# Patient Record
Sex: MALE | Race: ASIAN | Employment: FULL TIME | URBAN - METROPOLITAN AREA
[De-identification: names, ages, dates, MRNs, and addresses within clinical notes are randomized per-mention and may not be internally consistent; named-entity substitution may affect disease eponyms.]

---

## 2018-12-14 PROBLEM — M25.561 RIGHT KNEE PAIN, UNSPECIFIED CHRONICITY: Status: ACTIVE | Noted: 2018-12-14

## 2021-04-27 PROBLEM — S46.102A INJURY OF TENDON OF LONG HEAD OF LEFT BICEPS: Status: ACTIVE | Noted: 2021-04-27

## 2021-04-27 PROBLEM — M70.42 PREPATELLAR BURSITIS, LEFT KNEE: Status: ACTIVE | Noted: 2021-04-27

## 2021-04-27 PROBLEM — S43.52XA SPRAIN OF LEFT ACROMIOCLAVICULAR JOINT: Status: ACTIVE | Noted: 2021-04-27

## 2021-05-11 PROBLEM — S46.112A LABRAL TEAR OF LONG HEAD OF LEFT BICEPS TENDON: Status: ACTIVE | Noted: 2021-05-11

## 2021-07-19 PROBLEM — G89.29 CHRONIC MIDLINE LOW BACK PAIN WITHOUT SCIATICA: Status: ACTIVE | Noted: 2021-07-19

## 2021-07-19 PROBLEM — M54.50 CHRONIC MIDLINE LOW BACK PAIN WITHOUT SCIATICA: Status: ACTIVE | Noted: 2021-07-19

## 2021-09-08 PROBLEM — Z47.89 AFTERCARE FOLLOWING SURGERY OF THE MUSCULOSKELETAL SYSTEM: Status: ACTIVE | Noted: 2021-09-08

## 2023-12-04 ENCOUNTER — OFFICE VISIT (OUTPATIENT)
Dept: WOUND CARE | Facility: HOSPITAL | Age: 53
End: 2023-12-04
Attending: INTERNAL MEDICINE
Payer: COMMERCIAL

## 2023-12-04 VITALS
DIASTOLIC BLOOD PRESSURE: 91 MMHG | SYSTOLIC BLOOD PRESSURE: 157 MMHG | RESPIRATION RATE: 18 BRPM | TEMPERATURE: 98 F | HEART RATE: 121 BPM

## 2023-12-04 DIAGNOSIS — I87.331 IDIOPATHIC CHRONIC VENOUS HYPERTENSION OF RIGHT LOWER EXTREMITY WITH ULCER AND INFLAMMATION (HCC): ICD-10-CM

## 2023-12-04 DIAGNOSIS — I89.0 LYMPHEDEMA: ICD-10-CM

## 2023-12-04 DIAGNOSIS — L97.212 NON-PRESSURE CHRONIC ULCER OF RIGHT CALF WITH FAT LAYER EXPOSED (HCC): Primary | ICD-10-CM

## 2023-12-04 DIAGNOSIS — L97.312 NON-PRESSURE CHRONIC ULCER OF RIGHT ANKLE WITH FAT LAYER EXPOSED (HCC): ICD-10-CM

## 2023-12-04 DIAGNOSIS — R60.0 PEDAL EDEMA: ICD-10-CM

## 2023-12-04 PROCEDURE — 29581 APPL MULTLAYER CMPRN SYS LEG: CPT

## 2023-12-04 PROCEDURE — 99214 OFFICE O/P EST MOD 30 MIN: CPT

## 2023-12-04 PROCEDURE — 97597 DBRDMT OPN WND 1ST 20 CM/<: CPT | Performed by: INTERNAL MEDICINE

## 2023-12-04 NOTE — PROGRESS NOTES
Patient ID: Neyda Simmons is a 48year old male. Debridement Venous Ulcer Right; Anterior   Wound 12/04/23 #2- right anterior leg Right; Anterior    Performed by: Harshad Arellano MD  Authorized by: Harshad Arellano MD      Consent   Consent obtained? verbal  Consent given by: patient    Debridement Details  Performed by: physician  Debridement type: conservative sharp  Pain control: lidocaine 4%  Pain control administration type: topical    Pre-debridement measurements  Length (cm): 3.8  Width (cm): 1.6  Depth (cm): 0.1  Surface Area (cm^2): 6.08    Post-debridement measurements  Length (cm): 3.8  Width (cm): 1.6  Depth (cm): 0.2  Percent debrided: 100%  Surface Area (cm^2): 6.08  Area Debrided (cm^2): 6.08  Volume (cm^3): 1.22    Devitalized tissue debrided: biofilm and slough  Instrument(s) utilized: curette  Bleeding: small  Hemostasis obtained with: pressure  Procedural pain (0-10): 0  Post-procedural pain: 0   Response to treatment: procedure was tolerated well

## 2023-12-04 NOTE — PROGRESS NOTES
.Weekly Wound Education Note    Teaching Provided To: Patient  Training Topics: Discharge instructions;Dressing;Edema control; Compression  Training Method: Explain/Verbal;Written  Training Response: Patient responds and understands; Reinforcement needed

## 2023-12-04 NOTE — PATIENT INSTRUCTIONS
Low salt diet  Leg elevation  Modification at work for a sitting job for 4 weeks. Vascular MD consult. Return later this week for dressing change. See me in one week. Wound Cleaning and Dressings:    Dressing changes only in the clinic. Shower with protection - get SHOWER BOOT or CAST COVER from any pharmacy ( [address] or [facility] are options). Apply steroid cream on both legs ( CORTISONE CREAM  over the counter on left leg )   DRESSINGS: rocío / HF transfer / kerramax /   Change dressing only in the clinic. Compression Therapy : coflex-2  Compression Therapy Instructions:  1. Okay to wear overnight       2. Avoid prolonged standing in one place. It is better to have your calf muscles moving       to pump fluid out of the legs. 3.  Elevate leg(s) above the level of the heart when sitting or as much as possible. 4.  Take your diuretics as directed by your provider. Do not skip doses or change doses      unless instructed to do so by your provider. 5. Do not get leg(s) with compression wrap wet. If wraps are too tight as indicated        By pain, numbness/tingling or discoloration of toes remove wrap completely       and call the   wound center. Miscellaneous Instructions:  Supplement with a daily multivitamin   Low salt diet  Increase protein intake / consider protein supplements - see below  Elevate extremities at all times when sitting / laying down.     DIETARY MODIFICATIONS TO HELP WITH WOUND HEALING:    Protein: Meats, beans, eggs, milk and yogurt particularly [place] yogurt), tofu, soy nuts, soy protein products    Vitamin C: Citrus fruits and juices, strawberries, tomatoes, tomato juice, peppers, baked potatoes, spinach, broccoli, cauliflower, [place] sprouts, cabbage    Vitamin A: Dark green, leafy vegetables, orange or yellow vegetables, cantaloupe, fortified dairy products, liver, fortified cereals    Zinc: Fortified cereals, red meats, seafood    Consider Iván by [employer] (These are essential branch chain amino acids that help with tissue building and wound healing) and take 2 packets/day. you can order online at [employer] or [facility] [employer] REMINDERS:    The treatment plan has been discussed at length with you and your provider. Follow all instructions carefully, it is very important. If you do not follow all instructions, you are at  risk of your wound not healing, infection, possible loss of limb and even end of life. Please call the clinic during regular business hours ( 7:30 AM - 5:30 PM) if you notice increased bleeding, redness, warmth, pain or pus like drainage or start running a fever greater than 100.3. For after hour emergencies, please call your primary physician or go to the nearest emergency room.

## 2023-12-08 ENCOUNTER — OFFICE VISIT (OUTPATIENT)
Dept: WOUND CARE | Facility: HOSPITAL | Age: 53
End: 2023-12-08
Attending: INTERNAL MEDICINE
Payer: COMMERCIAL

## 2023-12-08 VITALS
RESPIRATION RATE: 16 BRPM | HEART RATE: 93 BPM | TEMPERATURE: 98 F | SYSTOLIC BLOOD PRESSURE: 154 MMHG | DIASTOLIC BLOOD PRESSURE: 87 MMHG

## 2023-12-08 DIAGNOSIS — L97.212 NON-PRESSURE CHRONIC ULCER OF RIGHT CALF WITH FAT LAYER EXPOSED (HCC): Primary | ICD-10-CM

## 2023-12-08 DIAGNOSIS — L97.312 NON-PRESSURE CHRONIC ULCER OF RIGHT ANKLE WITH FAT LAYER EXPOSED (HCC): ICD-10-CM

## 2023-12-08 PROCEDURE — 29581 APPL MULTLAYER CMPRN SYS LEG: CPT

## 2023-12-11 ENCOUNTER — OFFICE VISIT (OUTPATIENT)
Dept: WOUND CARE | Facility: HOSPITAL | Age: 53
End: 2023-12-11
Attending: INTERNAL MEDICINE
Payer: COMMERCIAL

## 2023-12-11 VITALS
TEMPERATURE: 98 F | DIASTOLIC BLOOD PRESSURE: 99 MMHG | SYSTOLIC BLOOD PRESSURE: 158 MMHG | RESPIRATION RATE: 16 BRPM | HEART RATE: 75 BPM

## 2023-12-11 DIAGNOSIS — L97.312 NON-PRESSURE CHRONIC ULCER OF RIGHT ANKLE WITH FAT LAYER EXPOSED (HCC): ICD-10-CM

## 2023-12-11 DIAGNOSIS — I87.331 IDIOPATHIC CHRONIC VENOUS HYPERTENSION OF RIGHT LOWER EXTREMITY WITH ULCER AND INFLAMMATION (HCC): ICD-10-CM

## 2023-12-11 DIAGNOSIS — I89.0 LYMPHEDEMA: ICD-10-CM

## 2023-12-11 DIAGNOSIS — R60.0 PEDAL EDEMA: ICD-10-CM

## 2023-12-11 DIAGNOSIS — L97.212 NON-PRESSURE CHRONIC ULCER OF RIGHT CALF WITH FAT LAYER EXPOSED (HCC): Primary | ICD-10-CM

## 2023-12-11 PROCEDURE — 29581 APPL MULTLAYER CMPRN SYS LEG: CPT

## 2023-12-11 NOTE — PATIENT INSTRUCTIONS
Return  for nurse visit on 12/18 and 12/22   See me on 12/29/23   Low salt diet  Leg elevation  Modification at work for a sitting job for 4 weeks. Vascular MD consult- 12/27/23  See me in one week. Wound Cleaning and Dressings:    Dressing changes only in the clinic. Shower with protection - get SHOWER BOOT or CAST COVER from any pharmacy ( [address] or [facility] are options). Apply steroid cream on both legs ( CORTISONE CREAM  over the counter on left leg )   DRESSINGS: rocío / HF transfer / kerramax /   Change dressing only in the clinic. Compression Therapy : coflex-2  Compression Therapy Instructions:  1. Okay to wear overnight       2. Avoid prolonged standing in one place. It is better to have your calf muscles moving       to pump fluid out of the legs. 3.  Elevate leg(s) above the level of the heart when sitting or as much as possible. 4.  Take your diuretics as directed by your provider. Do not skip doses or change doses      unless instructed to do so by your provider. 5. Do not get leg(s) with compression wrap wet. If wraps are too tight as indicated        By pain, numbness/tingling or discoloration of toes remove wrap completely       and call the   wound center. Miscellaneous Instructions:  Supplement with a daily multivitamin   Low salt diet  Increase protein intake / consider protein supplements - see below  Elevate extremities at all times when sitting / laying down.     DIETARY MODIFICATIONS TO HELP WITH WOUND HEALING:    Protein: Meats, beans, eggs, milk and yogurt particularly [place] yogurt), tofu, soy nuts, soy protein products    Vitamin C: Citrus fruits and juices, strawberries, tomatoes, tomato juice, peppers, baked potatoes, spinach, broccoli, cauliflower, [place] sprouts, cabbage    Vitamin A: Dark green, leafy vegetables, orange or yellow vegetables, cantaloupe, fortified dairy products, liver, fortified cereals    Zinc: Fortified cereals, red meats, seafood    Consider Iván by [employer] (These are essential branch chain amino acids that help with tissue building and wound healing) and take 2 packets/day. you can order online at [employer] or [id number] [facility] [employer] REMINDERS:    The treatment plan has been discussed at length with you and your provider. Follow all instructions carefully, it is very important. If you do not follow all instructions, you are at  risk of your wound not healing, infection, possible loss of limb and even end of life. Please call the clinic during regular business hours ( 7:30 AM - 5:30 PM) if you notice increased bleeding, redness, warmth, pain or pus like drainage or start running a fever greater than 100.3. For after hour emergencies, please call your primary physician or go to the nearest emergency room.

## 2023-12-11 NOTE — PROGRESS NOTES
.Weekly Wound Education Note    Teaching Provided To: Patient  Training Topics: Dressing; Discharge instructions;Edema control; Compression  Training Method: Explain/Verbal;Written  Training Response: Patient responds and understands            Wound to ankle has healed. Leg wound continues to improve. Continue hydrofera transfer, kerramax, calamine unna boot 30-40mmHg.   New compression garments ordered from [place].

## 2023-12-18 ENCOUNTER — OFFICE VISIT (OUTPATIENT)
Dept: WOUND CARE | Facility: HOSPITAL | Age: 53
End: 2023-12-18
Attending: INTERNAL MEDICINE
Payer: COMMERCIAL

## 2023-12-18 VITALS
TEMPERATURE: 99 F | SYSTOLIC BLOOD PRESSURE: 142 MMHG | DIASTOLIC BLOOD PRESSURE: 98 MMHG | HEART RATE: 95 BPM | RESPIRATION RATE: 14 BRPM

## 2023-12-18 DIAGNOSIS — L97.212 NON-PRESSURE CHRONIC ULCER OF RIGHT CALF WITH FAT LAYER EXPOSED (HCC): Primary | ICD-10-CM

## 2023-12-18 PROCEDURE — 29581 APPL MULTLAYER CMPRN SYS LEG: CPT

## 2023-12-22 ENCOUNTER — OFFICE VISIT (OUTPATIENT)
Dept: WOUND CARE | Facility: HOSPITAL | Age: 53
End: 2023-12-22
Attending: INTERNAL MEDICINE
Payer: COMMERCIAL

## 2023-12-22 VITALS
DIASTOLIC BLOOD PRESSURE: 106 MMHG | TEMPERATURE: 98 F | HEART RATE: 108 BPM | SYSTOLIC BLOOD PRESSURE: 147 MMHG | RESPIRATION RATE: 16 BRPM

## 2023-12-22 DIAGNOSIS — L97.212 NON-PRESSURE CHRONIC ULCER OF RIGHT CALF WITH FAT LAYER EXPOSED (HCC): Primary | ICD-10-CM

## 2023-12-22 PROCEDURE — 29581 APPL MULTLAYER CMPRN SYS LEG: CPT

## 2023-12-27 ENCOUNTER — APPOINTMENT (OUTPATIENT)
Dept: WOUND CARE | Facility: HOSPITAL | Age: 53
End: 2023-12-27
Attending: INTERNAL MEDICINE
Payer: COMMERCIAL

## 2023-12-27 VITALS
DIASTOLIC BLOOD PRESSURE: 100 MMHG | HEART RATE: 105 BPM | SYSTOLIC BLOOD PRESSURE: 138 MMHG | TEMPERATURE: 98 F | RESPIRATION RATE: 18 BRPM

## 2023-12-27 DIAGNOSIS — L97.212 NON-PRESSURE CHRONIC ULCER OF RIGHT CALF WITH FAT LAYER EXPOSED (HCC): Primary | ICD-10-CM

## 2023-12-27 DIAGNOSIS — L97.312 NON-PRESSURE CHRONIC ULCER OF RIGHT ANKLE WITH FAT LAYER EXPOSED (HCC): ICD-10-CM

## 2023-12-27 DIAGNOSIS — I87.331 IDIOPATHIC CHRONIC VENOUS HYPERTENSION OF RIGHT LOWER EXTREMITY WITH ULCER AND INFLAMMATION (HCC): ICD-10-CM

## 2023-12-27 DIAGNOSIS — R60.0 PEDAL EDEMA: ICD-10-CM

## 2023-12-27 DIAGNOSIS — I89.0 LYMPHEDEMA: ICD-10-CM

## 2023-12-27 PROCEDURE — 29581 APPL MULTLAYER CMPRN SYS LEG: CPT

## 2023-12-27 NOTE — PATIENT INSTRUCTIONS
Low salt diet  Leg elevation  Modification at work for a sitting job until wound closes. See me in one week. Wound Cleaning and Dressings:    Dressing changes only in the clinic. Shower with protection - get SHOWER BOOT or CAST COVER from any pharmacy ( [facility] or [facility] are options). DRESSINGS: rocío / HF transfer / kerramax /   Change dressing only in the clinic. Compression Therapy : coflex-2  Compression Therapy Instructions:  1. Okay to wear overnight       2. Avoid prolonged standing in one place. It is better to have your calf muscles moving       to pump fluid out of the legs. 3.  Elevate leg(s) above the level of the heart when sitting or as much as possible. 4.  Take your diuretics as directed by your provider. Do not skip doses or change doses      unless instructed to do so by your provider. 5. Do not get leg(s) with compression wrap wet. If wraps are too tight as indicated        By pain, numbness/tingling or discoloration of toes remove wrap completely       and call the   wound center. Miscellaneous Instructions:  Supplement with a daily multivitamin   Low salt diet  Increase protein intake / consider protein supplements - see below  Elevate extremities at all times when sitting / laying down. DIETARY MODIFICATIONS TO HELP WITH WOUND HEALING:    Protein: Meats, beans, eggs, milk and yogurt particularly [place] yogurt), tofu, soy nuts, soy protein products    Vitamin C: Citrus fruits and juices, strawberries, tomatoes, tomato juice, peppers, baked potatoes, spinach, broccoli, cauliflower, [place] sprouts, cabbage    Vitamin A: Dark green, leafy vegetables, orange or yellow vegetables, cantaloupe, fortified dairy products, liver, fortified cereals    Zinc: Fortified cereals, red meats, seafood    Consider Iván by [employer] (These are essential branch chain amino acids that help with tissue building and wound healing) and take 2 packets/day.  you can order online at [employer] or [id number] REMINDERS:    The treatment plan has been discussed at length with you and your provider. Follow all instructions carefully, it is very important. If you do not follow all instructions, you are at  risk of your wound not healing, infection, possible loss of limb and even end of life. Please call the clinic during regular business hours ( 7:30 AM - 5:30 PM) if you notice increased bleeding, redness, warmth, pain or pus like drainage or start running a fever greater than 100.3. For after hour emergencies, please call your primary physician or go to the nearest emergency room.

## 2023-12-27 NOTE — PROGRESS NOTES
Weekly Wound Education Note    Teaching Provided To: Patient  Training Topics: Cleasing and general instructions; Compression; Discharge instructions;Dressing;Edema control  Training Method: Explain/Verbal  Training Response: Patient responds and understands; Reinforcement needed        Notes: Improving. Pt states ultrasound completed by vascular today. Endoform, hydrofera transfer, ABD pad, unna boot 30-40mmhg.   Ordered compression garment from [facility] [employer].

## 2024-01-03 ENCOUNTER — OFFICE VISIT (OUTPATIENT)
Dept: WOUND CARE | Facility: HOSPITAL | Age: 54
End: 2024-01-03
Attending: INTERNAL MEDICINE
Payer: COMMERCIAL

## 2024-01-03 VITALS
HEART RATE: 116 BPM | DIASTOLIC BLOOD PRESSURE: 95 MMHG | RESPIRATION RATE: 14 BRPM | TEMPERATURE: 99 F | SYSTOLIC BLOOD PRESSURE: 149 MMHG

## 2024-01-03 DIAGNOSIS — R60.0 PEDAL EDEMA: ICD-10-CM

## 2024-01-03 DIAGNOSIS — I89.0 LYMPHEDEMA: ICD-10-CM

## 2024-01-03 DIAGNOSIS — L97.312 NON-PRESSURE CHRONIC ULCER OF RIGHT ANKLE WITH FAT LAYER EXPOSED (HCC): ICD-10-CM

## 2024-01-03 DIAGNOSIS — L97.212 NON-PRESSURE CHRONIC ULCER OF RIGHT CALF WITH FAT LAYER EXPOSED (HCC): Primary | ICD-10-CM

## 2024-01-03 DIAGNOSIS — I87.331 IDIOPATHIC CHRONIC VENOUS HYPERTENSION OF RIGHT LOWER EXTREMITY WITH ULCER AND INFLAMMATION (HCC): ICD-10-CM

## 2024-01-03 PROCEDURE — 29581 APPL MULTLAYER CMPRN SYS LEG: CPT

## 2024-01-03 NOTE — PROGRESS NOTES
Weekly Wound Education Note    Teaching Provided To: Patient  Training Topics: Cleasing and general instructions;Compression;Discharge instructions;Dressing;Edema control  Training Method: Explain/Verbal  Training Response: Patient responds and understands;Reinforcement needed        Notes: Improving, silver foam, unna boot 30-40mmhg. Pt borught in supplies from [employer] but no compression stocking. Message sent to rep for an update on compression.

## 2024-01-03 NOTE — PATIENT INSTRUCTIONS
Low salt diet  Leg elevation  Modification at work for a sitting job until wound closes.   See me in one week.     Wound Cleaning and Dressings:    Dressing changes only in the clinic.   Shower with protection - get SHOWER BOOT or CAST COVER from any pharmacy ( [facility] or [facility] are options).   DRESSINGS: rocío / HF transfer / kerramax /   Change dressing only in the clinic.     Compression Therapy : coflex-2  Compression Therapy Instructions:  1. Okay to wear overnight       2.  Avoid prolonged standing in one place.  It is better to have your calf muscles moving       to pump fluid out of the legs.    3.  Elevate leg(s) above the level of the heart when sitting or as much as possible.    4.  Take your diuretics as directed by your provider.  Do not skip doses or change doses      unless instructed to do so by your provider.    5. Do not get leg(s) with compression wrap wet. If wraps are too tight as indicated        By pain, numbness/tingling or discoloration of toes remove wrap completely       and call the   wound center.     Miscellaneous Instructions:  Supplement with a daily multivitamin   Low salt diet  Increase protein intake / consider protein supplements - see below  Elevate extremities at all times when sitting / laying down.    DIETARY MODIFICATIONS TO HELP WITH WOUND HEALING:    Protein: Meats, beans, eggs, milk and yogurt particularly [place] yogurt), tofu, soy nuts, soy protein products    Vitamin C: Citrus fruits and juices, strawberries, tomatoes, tomato juice, peppers, baked potatoes, spinach, broccoli, cauliflower, [place] sprouts, cabbage    Vitamin A: Dark green, leafy vegetables, orange or yellow vegetables, cantaloupe, fortified dairy products, liver, fortified cereals    Zinc: Fortified cereals, red meats, seafood    Consider Iván by [employer] (These are essential branch chain amino acids that help with tissue building and wound healing) and take 2 packets/day. you can order  online at [employer] or [employer]    ADDITIONAL REMINDERS:    The treatment plan has been discussed at length with you and your provider. Follow all instructions carefully, it is very important. If you do not follow all instructions, you are at  risk of your wound not healing, infection, possible loss of limb and even end of life.  Please call the clinic during regular business hours ( 7:30 AM - 5:30 PM) if you notice increased bleeding, redness, warmth, pain or pus like drainage or start running a fever greater than 100.3.    For after hour emergencies, please call your primary physician or go to the nearest emergency room.

## 2024-01-03 NOTE — PROGRESS NOTES
[facility] PROGRESS NOTE  ETELVINA BLANTON MD  1/3/2024    Chief Complaint:   Chief Complaint   Patient presents with    Wound Recheck     Pt here for follow up for right leg wound. He arrives with compression wrap in place. No new concerns        HPI:   Kareem Camejo is a 53 year old male coming in for a follow-up visit.    HPI    Wound closed with a scab  No s/o infection.  Tolerating wrap ok.     Review of Systems  Negative except HPI   Denies chest pain / SOB / palpitations  Denies fever.     Allergies  No Known Allergies    Current Meds:  Current Outpatient Medications   Medication Sig Dispense Refill    colchicine 0.6 MG Oral Tab Take 1 tablet (0.6 mg total) by mouth in the morning, at noon, and at bedtime. (Patient not taking: Reported on 12/4/2023) 15 tablet 0    PEG 3350-KCl-Na Bicarb-NaCl 420 g Oral Recon Soln Take as directed per MD 4000 mL 0    Meloxicam 15 MG Oral Tab Take 1 tablet (15 mg total) by mouth daily. (Patient not taking: Reported on 12/4/2023) 30 tablet 1    Diclofenac Sodium (VOLTAREN) 1 % Transdermal Gel Apply 2 g topically 4 (four) times daily. (Patient not taking: Reported on 12/4/2023) 1 Tube 3         EXAM:   Objective   Objective    Physical Exam    Vital Signs  Vitals:    01/03/24 1500   BP: (!) 149/95   Pulse: 116   Resp: 14   Temp: 98.6 °F (37 °C)       Wound Assessment  Wound 12/04/23 #2- right anterior leg Right;Anterior (Active)   Wound Image   01/03/24 1517   Drainage Amount Scant 01/03/24 1517   Drainage Description Serosanguineous 01/03/24 1517   Treatments Compression 12/27/23 1459   Dressing Other 12/22/23 1449   Wound Length (cm) 1.1 cm 01/03/24 1517   Wound Width (cm) 0.5 cm 01/03/24 1517   Wound Surface Area (cm^2) 0.55 cm^2 01/03/24 1517   Wound Depth (cm) 0 cm 01/03/24 1517   Wound Volume (cm^3) 0 cm^3 01/03/24 1517   Wound Healing % 100 01/03/24 1517   Margins Well-defined edges 01/03/24 1517   Non-staged Wound Description Full thickness  01/03/24 1517   Xuan-wound Assessment Dry;Hemosiderin staining;Edema 01/03/24 1517   Wound Granulation Tissue Red;Pink;Spongy 12/27/23 1459   Wound Bed Granulation (%) 100 % 12/27/23 1459   Wound Bed Slough (%) 10 % 12/11/23 0813   Wound Odor None 01/03/24 1517   Shape 100% scab 01/03/24 1517   Tunneling? No 01/03/24 1517   Undermining? No 01/03/24 1517   Sinus Tracts? No 01/03/24 1517       Compression Wrap 12/04/23 Ankle Right (Active)   Response to Treatment Well tolerated 12/27/23 1520   Compression Layers Multilayer 12/27/23 1520   Compression Product Type Unna Boot 12/27/23 1520   Dressing Applied Yes 12/27/23 1520   Compression Wrap Location Toes to Knee 12/27/23 1520   Compression Wrap Status Dry;Clean;Intact 12/27/23 1520           ASSESSMENT AND PLAN:     Assessment   Assessment    Encounter Diagnosis  1. Non-pressure chronic ulcer of right calf with fat layer exposed (HCC)    2. 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 Idiopathic chronic venous hypertension of right lower extremity with ulcer and inflammation (HCC)    4. Pedal edema    5. Lymphedema        Problem List  Patient Active Problem List   Diagnosis    Right knee pain, unspecified chronicity    Prepatellar bursitis, left knee    Injury of tendon of long head of left biceps    Sprain of left acromioclavicular joint    Labral tear of long head of left biceps tendon    Chronic midline low back pain without sciatica    s/p Yobani/Nathaniel bunionectomy right foot on 08/30/21. BASILIO 11/28/21.         MANAGEMENT    PROCEDURES:    Compression wrap applied after dressing change     Patient Instructions     Low salt diet  Leg elevation  Modification at work for a sitting job until wound closes.   See me in one week.     Wound Cleaning and Dressings:    Dressing changes only in the clinic.   Shower with protection - get SHOWER BOOT or CAST COVER from any pharmacy ( [email or web address] or [employer] are options).   DRESSINGS: rocío /  HF transfer / kerramax /   Change dressing only in the clinic.     Compression Therapy : coflex-2  Compression Therapy Instructions:  1. Okay to wear overnight       2.  Avoid prolonged standing in one place.  It is better to have your calf muscles moving       to pump fluid out of the legs.    3.  Elevate leg(s) above the level of the heart when sitting or as much as possible.    4.  Take your diuretics as directed by your provider.  Do not skip doses or change doses      unless instructed to do so by your provider.    5. Do not get leg(s) with compression wrap wet. If wraps are too tight as indicated        By pain, numbness/tingling or discoloration of toes remove wrap completely       and call the   wound center.     Miscellaneous Instructions:  Supplement with a daily multivitamin   Low salt diet  Increase protein intake / consider protein supplements - see below  Elevate extremities at all times when sitting / laying down.    DIETARY MODIFICATIONS TO HELP WITH WOUND HEALING:    Protein: Meats, beans, eggs, milk and yogurt particularly Greek yogurt), tofu, soy nuts, soy protein products    Vitamin C: Citrus fruits and juices, strawberries, tomatoes, tomato juice, peppers, baked potatoes, spinach, broccoli, cauliflower, [place] sprouts, cabbage    Vitamin A: Dark green, leafy vegetables, orange or yellow vegetables, cantaloupe, fortified dairy products, liver, fortified cereals    Zinc: Fortified cereals, red meats, seafood    Consider Iván by [employer] (These are essential branch chain amino acids that help with tissue building and wound healing) and take 2 packets/day. you can order online at [employer] or [facility]    ADDITIONAL REMINDERS:    The treatment plan has been discussed at length with you and your provider. Follow all instructions carefully, it is very important. If you do not follow all instructions, you are at  risk of your wound not healing, infection, possible loss of limb and even end of  life.  Please call the clinic during regular business hours ( 7:30 AM - 5:30 PM) if you notice increased bleeding, redness, warmth, pain or pus like drainage or start running a fever greater than 100.3.    For after hour emergencies, please call your primary physician or go to the nearest emergency room.    Patient/Caregiver Education: There are no barriers to learning. Medical education for above diagnosis given.   Answered all questions.    Outcome: Patient verbalizes understanding. Patient is notified to call with any questions, complications, allergies, or worsening or changing symptoms.  Patient is to call with any side effects or complications as a result of the treatments today.      DOCUMENTATION OF TIME SPENT: Code selection for this visit was based on time spent : 30 min on date of service in preparing to see the patient, obtaining and/or reviewing separately obtained history, performing a medically appropriate examination, counseling and educating the patient/family/caregiver, ordering medications or testing, referring and communicating with other healthcare providers, documenting clinical information in the E HR, independently interpreting results and communicating results to the patient/family/caregiver and care coordination with the patient's other providers.    Followup: Return in about 1 week (around 1/10/2024) for Wound followup.    Comment: Please note this report has been produced using speech recognition software and may contain errors related to that system including errors in grammar, punctuation, and spelling, as well as words and phrases that may be inappropriate. If there are any questions or concerns please feel free to contact the dictating provider for clarification.    Kait Vernon MD  1/3/2024  3:35 PM

## 2024-01-10 ENCOUNTER — OFFICE VISIT (OUTPATIENT)
Dept: WOUND CARE | Facility: HOSPITAL | Age: 54
End: 2024-01-10
Attending: INTERNAL MEDICINE
Payer: COMMERCIAL

## 2024-01-10 VITALS — SYSTOLIC BLOOD PRESSURE: 156 MMHG | DIASTOLIC BLOOD PRESSURE: 109 MMHG | RESPIRATION RATE: 18 BRPM | TEMPERATURE: 97 F

## 2024-01-10 DIAGNOSIS — I87.331 IDIOPATHIC CHRONIC VENOUS HYPERTENSION OF RIGHT LOWER EXTREMITY WITH ULCER AND INFLAMMATION (HCC): ICD-10-CM

## 2024-01-10 DIAGNOSIS — L97.312 NON-PRESSURE CHRONIC ULCER OF RIGHT ANKLE WITH FAT LAYER EXPOSED (HCC): ICD-10-CM

## 2024-01-10 DIAGNOSIS — L97.212 NON-PRESSURE CHRONIC ULCER OF RIGHT CALF WITH FAT LAYER EXPOSED (HCC): Primary | ICD-10-CM

## 2024-01-10 DIAGNOSIS — R60.0 PEDAL EDEMA: ICD-10-CM

## 2024-01-10 DIAGNOSIS — I89.0 LYMPHEDEMA: ICD-10-CM

## 2024-01-10 PROCEDURE — 99213 OFFICE O/P EST LOW 20 MIN: CPT

## 2024-01-10 NOTE — PROGRESS NOTES
[facility] PROGRESS NOTE  ETELVINA BLANTON MD  1/10/2024    Chief Complaint:   Chief Complaint   Patient presents with    Wound Care     Patient here for follow up for right leg wound. Patient states no concerns at this time.        HPI:   Kareem Camejo is a 53 year old male coming in for a follow-up visit.    HPI    Wound closed  Skin mature  Brought compression garment today    Review of Systems  Negative except HPI   Denies chest pain / SOB / palpitations  Denies fever.     Allergies  No Known Allergies    Current Meds:  Current Outpatient Medications   Medication Sig Dispense Refill    colchicine 0.6 MG Oral Tab Take 1 tablet (0.6 mg total) by mouth in the morning, at noon, and at bedtime. (Patient not taking: Reported on 12/4/2023) 15 tablet 0    PEG 3350-KCl-Na Bicarb-NaCl 420 g Oral Recon Soln Take as directed per MD 4000 mL 0    Meloxicam 15 MG Oral Tab Take 1 tablet (15 mg total) by mouth daily. (Patient not taking: Reported on 12/4/2023) 30 tablet 1    Diclofenac Sodium (VOLTAREN) 1 % Transdermal Gel Apply 2 g topically 4 (four) times daily. (Patient not taking: Reported on 12/4/2023) 1 Tube 3         EXAM:   Objective   Objective    Physical Exam    Vital Signs  Vitals:    01/10/24 1500   BP: (!) 156/109   Resp: 18   Temp: 97.2 °F (36.2 °C)       Wound Assessment  Wound 12/04/23 #2- right anterior leg Right;Anterior (Active)   Wound Image   01/10/24 1501   Drainage Amount Scant 01/10/24 1501   Drainage Description Serosanguineous 01/10/24 1501   Treatments Compression 01/03/24 1517   Dressing Other 12/22/23 1449   Wound Length (cm) 0.1 cm 01/10/24 1501   Wound Width (cm) 0.1 cm 01/10/24 1501   Wound Surface Area (cm^2) 0.01 cm^2 01/10/24 1501   Wound Depth (cm) 0 cm 01/10/24 1501   Wound Volume (cm^3) 0 cm^3 01/10/24 1501   Wound Healing % 100 01/10/24 1501   Margins Well-defined edges 01/10/24 1501   Non-staged Wound Description Full thickness 01/10/24 1501   Xuan-wound  Assessment Dry;Hemosiderin staining;Edema 01/10/24 1501   Wound Granulation Tissue Pink;Firm 01/10/24 1501   Wound Bed Granulation (%) 100 % 01/10/24 1501   Wound Bed Slough (%) 10 % 12/11/23 0813   Wound Odor None 01/10/24 1501   Shape 100% scab 01/03/24 1517   Tunneling? No 01/10/24 1501   Undermining? No 01/10/24 1501   Sinus Tracts? No 01/10/24 1501       Compression Wrap 12/04/23 Ankle Right (Active)   Response to Treatment Well tolerated 01/03/24 1631   Compression Layers Multilayer 01/03/24 1631   Compression Product Type Unna Boot 01/03/24 1631   Dressing Applied Yes 01/03/24 1631   Compression Wrap Location Toes to Knee 01/03/24 1631   Compression Wrap Status Dry;Clean;Intact 01/03/24 1631           ASSESSMENT AND PLAN:     Assessment   Assessment    Encounter Diagnosis  1. Non-pressure chronic ulcer of right calf with fat layer exposed (HCC)    2. 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 Idiopathic chronic venous hypertension of right lower extremity with ulcer and inflammation (HCC)    4. Pedal edema    5. Lymphedema        Problem List  Patient Active Problem List   Diagnosis    Right knee pain, unspecified chronicity    Prepatellar bursitis, left knee    Injury of tendon of long head of left biceps    Sprain of left acromioclavicular joint    Labral tear of long head of left biceps tendon    Chronic midline low back pain without sciatica    s/p Yobani/Nathaniel bunionectomy right foot on 08/30/21. BASILIO 11/28/21.         MANAGEMENT      Patient Instructions     Low salt diet  Leg elevation in between work.   Moisturize legs daily.     Compression Therapy : compression garment  Compression Therapy Instructions:  1. Okay to wear overnight       2.  Avoid prolonged standing in one place.  It is better to have your calf muscles moving       to pump fluid out of the legs.    3.  Elevate leg(s) above the level of the heart when sitting or as much as possible.    4.  Take your  diuretics as directed by your provider.  Do not skip doses or change doses      unless instructed to do so by your provider.    5. Do not get leg(s) with compression wrap wet. If wraps are too tight as indicated        By pain, numbness/tingling or discoloration of toes remove wrap completely       and call the   wound center.     Miscellaneous Instructions:  Supplement with a daily multivitamin   Low salt diet  Increase protein intake / consider protein supplements - see below  Elevate extremities at all times when sitting / laying down.    ADDITIONAL REMINDERS:    The treatment plan has been discussed at length with you and your provider. Follow all instructions carefully, it is very important. If you do not follow all instructions, you are at  risk of your wound not healing, infection, possible loss of limb and even end of life.  Please call the clinic during regular business hours ( 7:30 AM - 5:30 PM) if you notice increased bleeding, redness, warmth, pain or pus like drainage or start running a fever greater than 100.3.    For after hour emergencies, please call your primary physician or go to the nearest emergency room.      Patient/Caregiver Education: There are no barriers to learning. Medical education for above diagnosis given.   Answered all questions.    Outcome: Patient verbalizes understanding. Patient is notified to call with any questions, complications, allergies, or worsening or changing symptoms.  Patient is to call with any side effects or complications as a result of the treatments today.      DOCUMENTATION OF TIME SPENT: Code selection for this visit was based on time spent : 25 min on date of service in preparing to see the patient, obtaining and/or reviewing separately obtained history, performing a medically appropriate examination, counseling and educating the patient/family/caregiver, ordering medications or testing, referring and communicating with other healthcare providers, documenting  clinical information in the E HR, independently interpreting results and communicating results to the patient/family/caregiver and care coordination with the patient's other providers.    Followup: Return for if wound reopens - Discharge from Wound clinic..      Note to Patient:  The 21st Century Cures Act makes medical notes like these available to patients in the interest of transparency. However, be advised this is a medical document and is intended as [id number] communication; it is written in medical language and may appear blunt, direct, or contain abbreviations or verbiage that are unfamiliar. Medical documents are intended to carry relevant information, facts as evident, and the clinical opinion of the practitioner.    Also, please note that this report has been produced using speech recognition software and may contain errors related to that system including, but not limited to, errors in grammar, punctuation, and spelling, as well as words and phrases that possibly may have been recognized inappropriately.  If there are any questions or concerns, contact the dictating provider for clarification.      Kait Vernon MD  1/10/2024  3:16 PM

## 2024-01-10 NOTE — PROGRESS NOTES
Weekly Wound Education Note    Teaching Provided To: Patient  Training Topics: Cleasing and general instructions;Compression;Discharge instructions;Dressing;Edema control  Training Method: Explain/Verbal  Training Response: Patient responds and understands;Reinforcement needed        Notes: Per provider wound is healed. Ok to shower, keep area cover for 1 more week. Silver foam applied and transitioned into Medivan 2 layer compression stocking. Educated patient on importance of compression and how to apply 2 layer garment.

## 2024-01-10 NOTE — PATIENT INSTRUCTIONS
Low salt diet  Leg elevation in between work.   Moisturize legs daily.     Compression Therapy : compression garment  Compression Therapy Instructions:  1. Okay to wear overnight       2.  Avoid prolonged standing in one place.  It is better to have your calf muscles moving       to pump fluid out of the legs.    3.  Elevate leg(s) above the level of the heart when sitting or as much as possible.    4.  Take your diuretics as directed by your provider.  Do not skip doses or change doses      unless instructed to do so by your provider.    5. Do not get leg(s) with compression wrap wet. If wraps are too tight as indicated        By pain, numbness/tingling or discoloration of toes remove wrap completely       and call the   wound center.     Miscellaneous Instructions:  Supplement with a daily multivitamin   Low salt diet  Increase protein intake / consider protein supplements - see below  Elevate extremities at all times when sitting / laying down.    ADDITIONAL REMINDERS:    The treatment plan has been discussed at length with you and your provider. Follow all instructions carefully, it is very important. If you do not follow all instructions, you are at  risk of your wound not healing, infection, possible loss of limb and even end of life.  Please call the clinic during regular business hours ( 7:30 AM - 5:30 PM) if you notice increased bleeding, redness, warmth, pain or pus like drainage or start running a fever greater than 100.3.    For after hour emergencies, please call your primary physician or go to the nearest emergency room.

## 2024-11-06 ENCOUNTER — OFFICE VISIT (OUTPATIENT)
Dept: WOUND CARE | Facility: HOSPITAL | Age: 54
End: 2024-11-06
Attending: INTERNAL MEDICINE
Payer: COMMERCIAL

## 2024-11-06 VITALS
WEIGHT: 194 LBS | BODY MASS INDEX: 27.16 KG/M2 | RESPIRATION RATE: 18 BRPM | SYSTOLIC BLOOD PRESSURE: 139 MMHG | HEART RATE: 109 BPM | TEMPERATURE: 98 F | HEIGHT: 71 IN | DIASTOLIC BLOOD PRESSURE: 90 MMHG

## 2024-11-06 DIAGNOSIS — L97.212 NON-PRESSURE CHRONIC ULCER OF RIGHT CALF WITH FAT LAYER EXPOSED (HCC): Primary | ICD-10-CM

## 2024-11-06 DIAGNOSIS — I89.0 LYMPHEDEMA: ICD-10-CM

## 2024-11-06 DIAGNOSIS — R60.0 PEDAL EDEMA: ICD-10-CM

## 2024-11-06 DIAGNOSIS — I87.331 IDIOPATHIC CHRONIC VENOUS HYPERTENSION OF RIGHT LOWER EXTREMITY WITH ULCER AND INFLAMMATION (HCC): ICD-10-CM

## 2024-11-06 DIAGNOSIS — L97.312 NON-PRESSURE CHRONIC ULCER OF RIGHT ANKLE WITH FAT LAYER EXPOSED (HCC): ICD-10-CM

## 2024-11-06 PROCEDURE — 87205 SMEAR GRAM STAIN: CPT | Performed by: INTERNAL MEDICINE

## 2024-11-06 PROCEDURE — 87077 CULTURE AEROBIC IDENTIFY: CPT | Performed by: INTERNAL MEDICINE

## 2024-11-06 PROCEDURE — 87186 SC STD MICRODIL/AGAR DIL: CPT | Performed by: INTERNAL MEDICINE

## 2024-11-06 PROCEDURE — 87147 CULTURE TYPE IMMUNOLOGIC: CPT | Performed by: INTERNAL MEDICINE

## 2024-11-06 PROCEDURE — 29581 APPL MULTLAYER CMPRN SYS LEG: CPT

## 2024-11-06 PROCEDURE — 87070 CULTURE OTHR SPECIMN AEROBIC: CPT | Performed by: INTERNAL MEDICINE

## 2024-11-06 NOTE — PROGRESS NOTES
[facility] CONSULTATION NOTE  ETELVINA BLANTON MD  11/6/2024    Kareem Camejo is a 54 year old male.    Chief Complaint   Patient presents with    Wound Care     Patient here for initial visit of right ankle. He states the wound opened up about 3 weeks ago out of nowhere. He wears compression most days but not always. He was using some ointment from the pharmacy does not know the name of it with a gauze.     HPI    53 yo  male well known to me from previous visits for venous leg ulcers returns with a right ankle wound and left leg wound that opened several weeks ago - not improving.   He has been noncompliant about wearing his compression garment that was recommended and ordered previously.     There is significant pain in the wound area.     He continues to have a standing job- 12 hour shifts daily.       Diabetes status: no    Smoker status: no    Past Medical history, Surgical history, Social history, Family history reviewed with patient.   Medications reviewed.   Epic chart notes including provider notes, labs, imaging etc. Reviewed.   [facility] care everywhere queried and results reviewed.     Past Medical Hx:  History reviewed. No pertinent past medical history.  Past Surgical Hx:  Past Surgical History:   Procedure Laterality Date    Colonoscopy N/A 1/14/2021    Procedure: COLONOSCOPY, POSSIBLE BIOPSY, POSSIBLE POLYPECTOMY 64113;  Surgeon: Riley Catalan MD;  Location: [facility]     Problem List:  Patient Active Problem List   Diagnosis    Right knee pain, unspecified chronicity    Prepatellar bursitis, left knee    Injury of tendon of long head of left biceps    Sprain of left acromioclavicular joint    Labral tear of long head of left biceps tendon    Chronic midline low back pain without sciatica    s/p Yobani/Nathaniel bunionectomy right foot on 08/30/21. BASILIO 11/28/21.     Social History:  Social History     Socioeconomic History    Marital status: Single   Tobacco Use     Smoking status: Never    Smokeless tobacco: Never   Vaping Use    Vaping status: Never Used   Substance and Sexual Activity    Alcohol use: Yes    Drug use: No     Family History:  Family History   Problem Relation Age of Onset    Diabetes Mother      Allergies:  Allergies[1]  Current Meds:  Current Outpatient Medications   Medication Sig Dispense Refill    colchicine 0.6 MG Oral Tab Take 1 tablet (0.6 mg total) by mouth in the morning, at noon, and at bedtime. (Patient not taking: Reported on 12/4/2023) 15 tablet 0    PEG 3350-KCl-Na Bicarb-NaCl 420 g Oral Recon Soln Take as directed per MD 4000 mL 0    Meloxicam 15 MG Oral Tab Take 1 tablet (15 mg total) by mouth daily. (Patient not taking: Reported on 12/4/2023) 30 tablet 1    Diclofenac Sodium (VOLTAREN) 1 % Transdermal Gel Apply 2 g topically 4 (four) times daily. (Patient not taking: Reported on 12/4/2023) 1 Tube 3     Tobacco Counseling:  Counseling given: Not Answered       REVIEW OF SYSTEMS:   CONSTITUTIONAL:  Denies unusual weight gain/loss, fever, chills, or fatigue.  EENT:  Eyes:  Denies eye pain, visual loss, blurred vision, double vision or yellow sclerae.   CARDIOVASCULAR:  Denies chest pain, chest pressure, chest discomfort, palpitations, dyspnea on exertion or at rest.  RESPIRATORY:  Denies shortness of breath, wheezing, cough or sputum.  GASTROINTESTINAL:  Denies abdominal pain, nausea, vomiting, constipation, diarrhea, or blood in stool.  MUSCULOSKELETAL:  Denies weakness  NEUROLOGICAL:  Denies headache, seizures, dizziness, syncope      Objective   Objective  Physical Exam    Wound Assessment  Wound 11/06/24 #3 right medial ankle Ankle Right;Medial (Active)   Date First Assessed/Time First Assessed: 11/06/24 1517    Wound Number (Wound Clinic Only): #3 right medial ankle  Primary Wound Type: Venous Ulcer  Location: Ankle  Wound Location Orientation: Right;Medial      Assessments 11/6/2024  3:18 PM   Wound Image     Drainage Amount Scant    Drainage Description Serosanguineous   Wound Length (cm) 4.8 cm   Wound Width (cm) 2.4 cm   Wound Surface Area (cm^2) 11.52 cm^2   Wound Depth (cm) 0.1 cm   Wound Volume (cm^3) 1.152 cm^3   Margins Poorly defined   Non-staged Wound Description Full thickness   Xuan-wound Assessment Hemosiderin staining;Moist;Edema   Wound Granulation Tissue Red;Firm   Wound Bed Granulation (%) 50 %   Wound Bed Slough (%) 50 %   Wound Odor None       No associated orders.       Wound 11/06/24 #4 left anterior leg Leg Left;Anterior (Active)   Date First Assessed/Time First Assessed: 11/06/24 1518    Wound Number (Wound Clinic Only): #4 left anterior leg  Location: Leg  Wound Location Orientation: Left;Anterior      Assessments 11/6/2024  3:20 PM   Wound Image     Drainage Amount Unable to assess   Wound Length (cm) 0.1 cm   Wound Width (cm) 0.2 cm   Wound Surface Area (cm^2) 0.02 cm^2   Wound Depth (cm) 0.1 cm   Wound Volume (cm^3) 0.002 cm^3   Margins Well-defined edges   Non-staged Wound Description Full thickness   Xuan-wound Assessment Hemosiderin staining;Edema   Wound Granulation Tissue Red;Firm   Wound Bed Granulation (%) 100 %   Wound Odor None       No associated orders.               PHYSICAL EXAM:   /90   Pulse 109   Temp 97.9 °F (36.6 °C)   Resp 18   Ht 71\"   Wt 194 lb (88 kg)   BMI 27.06 kg/m²  Estimated body mass index is 27.06 kg/m² as calculated from the following:    Height as of this encounter: 71\".    Weight as of this encounter: 194 lb (88 kg).   Vital signs reviewed.Appears stated age, well groomed.  Physical Exam:  GEN:  Patient is alert, awake and oriented, well developed, well nourished, no apparent distress.  EXTREMITIES:  strong DP pulse bilateral - doppler  NEURO:  No deficit, normal gait,     Assessment   Assessment    Encounter Diagnosis  1. Non-pressure chronic ulcer of right calf with fat layer exposed (HCC)    2. 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  Idiopathic chronic venous hypertension of right lower extremity with ulcer and inflammation (HCC)    4. Pedal edema    5. Lymphedema        Problem List  Patient Active Problem List   Diagnosis    Right knee pain, unspecified chronicity    Prepatellar bursitis, left knee    Injury of tendon of long head of left biceps    Sprain of left acromioclavicular joint    Labral tear of long head of left biceps tendon    Chronic midline low back pain without sciatica    s/p Yobani/Nathaniel bunionectomy right foot on 08/30/21. BASILIO 11/28/21.       Plan    Wound culture  Start absorptive dressins and compression wraps.   W/o worsening infection.   Low sat diet  Leg elevation.   Take time off work  Try to get a job that does not involve standing.   Return Friday for dressings change  See me in a week.       PROCEDURES:     Wound culture right ankle wound.     Compression garment bilateral    Patient Instructions       Nurse visit Friday.   Start absorptive dressings and compression wraps.   Low salt diet  Leg elevation  Modification at work for a sitting job until wound closes.   See me in one week.     Wound Cleaning and Dressings:    Dressing changes only in the clinic.   Shower with protection - get SHOWER BOOT or CAST COVER from any pharmacy ( [employer] or [facility] are options).   DRESSINGS: endoform / HF transfer / kerramax /   Change dressing only in the clinic.     Compression Therapy : coflex-2  Compression Therapy Instructions:  1. Okay to wear overnight       2.  Avoid prolonged standing in one place.  It is better to have your calf muscles moving       to pump fluid out of the legs.    3.  Elevate leg(s) above the level of the heart when sitting or as much as possible.    4.  Take your diuretics as directed by your provider.  Do not skip doses or change doses      unless instructed to do so by your provider.    5. Do not get leg(s) with compression wrap wet. If wraps are too tight as indicated        By pain,  numbness/tingling or discoloration of toes remove wrap completely       and call the   wound center.     Miscellaneous Instructions:  Supplement with a daily multivitamin   Low salt diet  Increase protein intake / consider protein supplements - see below  Elevate extremities at all times when sitting / laying down.    DIETARY MODIFICATIONS TO HELP WITH WOUND HEALING:    Protein: Meats, beans, eggs, milk and yogurt particularly Greek yogurt), tofu, soy nuts, soy protein products    Vitamin C: Citrus fruits and juices, strawberries, tomatoes, tomato juice, peppers, baked potatoes, spinach, broccoli, cauliflower, [place] sprouts, cabbage    Vitamin A: Dark green, leafy vegetables, orange or yellow vegetables, cantaloupe, fortified dairy products, liver, fortified cereals    Zinc: Fortified cereals, red meats, seafood    Consider Iván by [employer] (These are essential branch chain amino acids that help with tissue building and wound healing) and take 2 packets/day. you can order online at [email or web address] or [email or web address]    ADDITIONAL REMINDERS:    The treatment plan has been discussed at length with you and your provider. Follow all instructions carefully, it is very important. If you do not follow all instructions, you are at  risk of your wound not healing, infection, possible loss of limb and even end of life.  Please call the clinic during regular business hours ( 7:30 AM - 5:30 PM) if you notice increased bleeding, redness, warmth, pain or pus like drainage or start running a fever greater than 100.3.    For after hour emergencies, please call your primary physician or go to the nearest emergency room.    Patient/Caregiver Education: There are no barriers to learning. Medical education for above diagnosis given.   Answered all questions.    Outcome: Patient verbalizes understanding. Patient is notified to call with any questions, complications, allergies, or worsening or changing symptoms.  Patient is to call with any side  effects or complications as a result of the treatments today.      DOCUMENTATION OF TIME SPENT: Code selection for this visit was based on time spent : 45 min on date of service in preparing to see the patient, obtaining and/or reviewing separately obtained history, performing a medically appropriate examination, counseling and educating the patient/family/caregiver, ordering medications or testing, referring and communicating with other healthcare providers, documenting clinical information in the E HR, independently interpreting results and communicating results to the patient/family/caregiver and care coordination with the patient's other providers.    Followup: Return in about 1 week (around 11/13/2024), or nurse visit friday, for Wound followup.      Note to Patient:  The 21st Century Cures Act makes medical notes like these available to patients in the interest of transparency. However, be advised this is a medical document and is intended as [id number] communication; it is written in medical language and may appear blunt, direct, or contain abbreviations or verbiage that are unfamiliar. Medical documents are intended to carry relevant information, facts as evident, and the clinical opinion of the practitioner.    Also, please note that this report has been produced using speech recognition software and may contain errors related to that system including, but not limited to, errors in grammar, punctuation, and spelling, as well as words and phrases that possibly may have been recognized inappropriately.  If there are any questions or concerns, contact the dictating provider for clarification.      Kait Vernon MD  11/6/2024  3:29 PM            [1] No Known Allergies

## 2024-11-06 NOTE — PROGRESS NOTES
Weekly Wound Education Note    Teaching Provided To: Patient  Training Topics: Cleasing and general instructions;Compression;Discharge instructions;Dressing;Edema control  Training Method: Explain/Verbal  Training Response: Patient responds and understands;Reinforcement needed        Notes: Initial visit: wound to left leg and right ankle. Right ankle wound cultured. Triad paste to periwound, Hydrofera transfer, kerramax, coflex 2 30-40mmhg - BLE. Plan to order 2 layer stocking 30-40mmhg at next visit.

## 2024-11-06 NOTE — PATIENT INSTRUCTIONS
Nurse visit Friday.   Start absorptive dressings and compression wraps.   Low salt diet  Leg elevation  Modification at work for a sitting job until wound closes.   See me in one week.     Wound Cleaning and Dressings:    Dressing changes only in the clinic.   Shower with protection - get SHOWER BOOT or CAST COVER from any pharmacy ( [employer] or [employer] are options).   DRESSINGS: endoform / HF transfer / kerramax /   Change dressing only in the clinic.     Compression Therapy : coflex-2  Compression Therapy Instructions:  1. Okay to wear overnight       2.  Avoid prolonged standing in one place.  It is better to have your calf muscles moving       to pump fluid out of the legs.    3.  Elevate leg(s) above the level of the heart when sitting or as much as possible.    4.  Take your diuretics as directed by your provider.  Do not skip doses or change doses      unless instructed to do so by your provider.    5. Do not get leg(s) with compression wrap wet. If wraps are too tight as indicated        By pain, numbness/tingling or discoloration of toes remove wrap completely       and call the   wound center.     Miscellaneous Instructions:  Supplement with a daily multivitamin   Low salt diet  Increase protein intake / consider protein supplements - see below  Elevate extremities at all times when sitting / laying down.    DIETARY MODIFICATIONS TO HELP WITH WOUND HEALING:    Protein: Meats, beans, eggs, milk and yogurt particularly Greek yogurt), tofu, soy nuts, soy protein products    Vitamin C: Citrus fruits and juices, strawberries, tomatoes, tomato juice, peppers, baked potatoes, spinach, broccoli, cauliflower, [place] sprouts, cabbage    Vitamin A: Dark green, leafy vegetables, orange or yellow vegetables, cantaloupe, fortified dairy products, liver, fortified cereals    Zinc: Fortified cereals, red meats, seafood    Consider Iván by [employer] (These are essential branch chain amino acids that help  with tissue building and wound healing) and take 2 packets/day. you can order online at [employer] or [email or web address]    ADDITIONAL REMINDERS:    The treatment plan has been discussed at length with you and your provider. Follow all instructions carefully, it is very important. If you do not follow all instructions, you are at  risk of your wound not healing, infection, possible loss of limb and even end of life.  Please call the clinic during regular business hours ( 7:30 AM - 5:30 PM) if you notice increased bleeding, redness, warmth, pain or pus like drainage or start running a fever greater than 100.3.    For after hour emergencies, please call your primary physician or go to the nearest emergency room.

## 2024-11-08 ENCOUNTER — OFFICE VISIT (OUTPATIENT)
Dept: WOUND CARE | Facility: HOSPITAL | Age: 54
End: 2024-11-08
Attending: INTERNAL MEDICINE
Payer: COMMERCIAL

## 2024-11-08 VITALS
SYSTOLIC BLOOD PRESSURE: 160 MMHG | RESPIRATION RATE: 16 BRPM | TEMPERATURE: 98 F | HEART RATE: 100 BPM | DIASTOLIC BLOOD PRESSURE: 80 MMHG

## 2024-11-08 DIAGNOSIS — L97.312 NON-PRESSURE CHRONIC ULCER OF RIGHT ANKLE WITH FAT LAYER EXPOSED (HCC): Primary | ICD-10-CM

## 2024-11-08 DIAGNOSIS — L97.922 NON-PRESSURE CHRONIC ULCER OF LEFT LOWER LEG WITH FAT LAYER EXPOSED (HCC): ICD-10-CM

## 2024-11-08 PROCEDURE — 99213 OFFICE O/P EST LOW 20 MIN: CPT

## 2024-11-08 NOTE — PROGRESS NOTES
Chief Complaint   Patient presents with    Wound Care     Patients is here for a follow up. Patients stated pain on the wound. Bilateral lower leg the wrap sled down a little bit            Current Outpatient Medications:     gentamicin 0.1 % External Ointment, Apply 1 Application topically 3 (three) times daily., Disp: 30 g, Rfl: 3    sulfamethoxazole-trimethoprim -160 MG Oral Tab per tablet, Take 1 tablet by mouth 2 (two) times daily for 7 days., Disp: 14 tablet, Rfl: 0    colchicine 0.6 MG Oral Tab, Take 1 tablet (0.6 mg total) by mouth in the morning, at noon, and at bedtime. (Patient not taking: Reported on 12/4/2023), Disp: 15 tablet, Rfl: 0    PEG 3350-KCl-Na Bicarb-NaCl 420 g Oral Recon Soln, Take as directed per MD, Disp: 4000 mL, Rfl: 0    Meloxicam 15 MG Oral Tab, Take 1 tablet (15 mg total) by mouth daily. (Patient not taking: Reported on 12/4/2023), Disp: 30 tablet, Rfl: 1    Diclofenac Sodium (VOLTAREN) 1 % Transdermal Gel, Apply 2 g topically 4 (four) times daily. (Patient not taking: Reported on 12/4/2023), Disp: 1 Tube, Rfl: 3    Allergies[1]       HISTORY:     Past medical, surgical, family and social history updated where appropriate.    PHYSICAL EXAM:   /80   Pulse 100   Temp 97.9 °F (36.6 °C)   Resp 16        Vital signs reviewed.      Calf  Point of Measurement - Left Calf: 36  Point of Measurement - Right Calf: 36  Left Calf from:: Heel  Calf Left cm:: 35.3  Right Calf from:: Heel  Right Calf cm:: 34.3    Ankle  Point of Measurement - Left Ankle: 11  Point of Measurement - Right Ankle: 11  Left Ankle from:: Heel  Left Ankle cm:: 21.3     Right Ankle from:: Heel  Right Ankle cm:: 21.3       Wound 11/06/24 #3 right medial ankle Ankle Right;Medial (Active)   Date First Assessed/Time First Assessed: 11/06/24 7787    Wound Number (Wound Clinic Only): #3 right medial ankle  Primary Wound Type: Venous Ulcer  Location: Ankle  Wound Location Orientation: Right;Medial      Assessments  11/6/2024  3:18 PM 11/8/2024  3:36 PM   Wound Image       Drainage Amount Scant Small   Drainage Description Serosanguineous Serous;Yellow   Treatments Compression Compression   Wound Length (cm) 4.8 cm 4.4 cm   Wound Width (cm) 2.4 cm 2.1 cm   Wound Surface Area (cm^2) 11.52 cm^2 9.24 cm^2   Wound Depth (cm) 0.1 cm 0.1 cm   Wound Volume (cm^3) 1.152 cm^3 0.924 cm^3   Wound Healing % -- 20   Margins Poorly defined Well-defined edges   Non-staged Wound Description Full thickness Full thickness   Xuan-wound Assessment Hemosiderin staining;Moist;Edema Hemosiderin staining;Moist;Edema   Wound Granulation Tissue Red;Firm Firm;Pink   Wound Bed Granulation (%) 50 % 90 %   Wound Bed Epithelium (%) -- 5 %   Wound Bed Slough (%) 50 % 5 %   Wound Odor None None   Tunneling? No --   Undermining? No --   Sinus Tracts? No --       No associated orders.       Wound 11/06/24 #4 left anterior leg Leg Left;Anterior (Active)   Date First Assessed/Time First Assessed: 11/06/24 1518    Wound Number (Wound Clinic Only): #4 left anterior leg  Location: Leg  Wound Location Orientation: Left;Anterior      Assessments 11/6/2024  3:20 PM 11/8/2024  3:35 PM   Wound Image       Drainage Amount Unable to assess Scant   Drainage Description -- Serosanguineous   Treatments Compression Compression   Wound Length (cm) 0.1 cm 0.1 cm   Wound Width (cm) 0.2 cm 0.1 cm   Wound Surface Area (cm^2) 0.02 cm^2 0.01 cm^2   Wound Depth (cm) 0.1 cm 0.1 cm   Wound Volume (cm^3) 0.002 cm^3 0.001 cm^3   Wound Healing % -- 50   Margins Well-defined edges Well-defined edges   Non-staged Wound Description Full thickness Full thickness   Xuan-wound Assessment Hemosiderin staining;Edema Hemosiderin staining;Edema   Wound Granulation Tissue Red;Firm Firm;Pink   Wound Bed Granulation (%) 100 % 100 %   Wound Odor None None   Tunneling? No --   Undermining? No --   Sinus Tracts? No --       No associated orders.       Compression Wrap 11/06/24 Ankle Anterior;Right (Active)    Placement Date: 11/06/24   Location: Ankle  Wound Location Orientation: Anterior;Right      Assessments 11/6/2024  5:18 PM   Response to Treatment Well tolerated   Compression Layers Multilayer   Compression Product Type Coflex   Dressing Applied Yes   Compression Wrap Location Toes to Knee   Compression Wrap Status Clean;Dry;Intact       No associated orders.       Compression Wrap 11/06/24 Leg Anterior;Left (Active)   Placement Date: 11/06/24   Location: Leg  Wound Location Orientation: Anterior;Left      Assessments 11/6/2024  5:19 PM   Response to Treatment Well tolerated   Compression Layers Multilayer   Compression Product Type Coflex   Dressing Applied Yes   Compression Wrap Status Clean;Dry;Intact       No associated orders.          ASSESSMENT AND PLAN:        Risks, benefits, and alternatives of current treatment plan discussed in detail.  Questions and concerns addressed. Red flags to RTC or ED reviewed.  Patient (or parent) agrees to plan.      No follow-ups on file.  Weekly Wound Education Note    Teaching Provided To: Patient  Training Topics: Discharge instructions;Cleasing and general instructions;Dressing;Edema control;Compression  Training Method: Demonstration;Explain/Verbal  Training Response: Reinforcement needed        Notes: Pt here for nurse visit to right medial ankle and left anterior lower leg wounds. BIlateral lower leg wraps both slid down - right leg slid to mid calf and left lg slide down just slightly. Pt denies any concerns or issues but did mention having more pain in wounds lately.  Edema measurement decreased, right medial ankle wound measurement decreased and left anterior lower leg wound measurement decreased. Culture obtained on 11/6/24 provider recommended topical ABT ointment gentamicin and oral ABT to be sent to patient pharmacy. Patient did not bring ointment to visit today or his own compression garments. Educated patient at visit of new orders reicved from recent  culture and informed patient he needs to  both topical and oral ABT. Educated and demonstrated to patient how to apply ointment to both wounds and cover with dry dressing, applied bacitracin ointment and 4x4 bordered gauze dressing at visit today. Educated patient that he needs to wear his own compression garments daily to control swelling. Today at visit applied spandagrip (e) to BLE. Pt to follow up with provider on 11/13/24.      Abisai LOPEZ RN   11/8/2024  4:04 PM            [1] No Known Allergies

## 2024-11-08 NOTE — PROGRESS NOTES
Confirmed with provider topical abx to be done to both wounds - left leg and right ankle. Wear compression stockings or spandagrip E x2 - BLE.

## 2024-11-13 ENCOUNTER — OFFICE VISIT (OUTPATIENT)
Dept: WOUND CARE | Facility: HOSPITAL | Age: 54
End: 2024-11-13
Attending: INTERNAL MEDICINE
Payer: COMMERCIAL

## 2024-11-13 VITALS
TEMPERATURE: 98 F | RESPIRATION RATE: 16 BRPM | HEART RATE: 116 BPM | SYSTOLIC BLOOD PRESSURE: 115 MMHG | DIASTOLIC BLOOD PRESSURE: 66 MMHG

## 2024-11-13 DIAGNOSIS — I87.331 IDIOPATHIC CHRONIC VENOUS HYPERTENSION OF RIGHT LOWER EXTREMITY WITH ULCER AND INFLAMMATION (HCC): ICD-10-CM

## 2024-11-13 DIAGNOSIS — L97.312 NON-PRESSURE CHRONIC ULCER OF RIGHT ANKLE WITH FAT LAYER EXPOSED (HCC): Primary | ICD-10-CM

## 2024-11-13 DIAGNOSIS — R60.0 PEDAL EDEMA: ICD-10-CM

## 2024-11-13 DIAGNOSIS — R23.8 SLOUGHING OF WOUND: ICD-10-CM

## 2024-11-13 DIAGNOSIS — L97.222 NON-PRESSURE CHRONIC ULCER OF LEFT CALF WITH FAT LAYER EXPOSED (HCC): ICD-10-CM

## 2024-11-13 DIAGNOSIS — I89.0 LYMPHEDEMA: ICD-10-CM

## 2024-11-13 DIAGNOSIS — T14.8XXA INFECTED WOUND: ICD-10-CM

## 2024-11-13 DIAGNOSIS — L08.9 INFECTED WOUND: ICD-10-CM

## 2024-11-13 DIAGNOSIS — A49.8 PSEUDOMONAS INFECTION: ICD-10-CM

## 2024-11-13 DIAGNOSIS — T14.8XXD DELAYED WOUND HEALING: ICD-10-CM

## 2024-11-13 PROCEDURE — 99214 OFFICE O/P EST MOD 30 MIN: CPT

## 2024-11-13 NOTE — PROGRESS NOTES
[facility] PROGRESS NOTE  ETELVINA BLANTON MD  11/13/2024    Chief Complaint:   Chief Complaint   Patient presents with    Wound Care     Patients is here for a follow up. Patients complain pain on the wound        HPI:   Kareem Camejo is a 54 year old male coming in for a follow-up visit.    HPI    Wounds stable / improving.   Wound culture positive for pseudomonas  - treated with a a week of bactrim and topical gentamicin which he only started few days ago.     Left leg wound much improved.     Right ankle wound also with increased epithelium moving in.     Edema persists.     New rash both legs from ? Compression being too tight.     Review of Systems  Negative except HPI   Denies chest pain / SOB / palpitations  Denies fever.     Allergies  Allergies[1]    Current Meds:  Current Outpatient Medications   Medication Sig Dispense Refill    gentamicin 0.1 % External Ointment Apply 1 Application topically 3 (three) times daily. 30 g 3    sulfamethoxazole-trimethoprim -160 MG Oral Tab per tablet Take 1 tablet by mouth 2 (two) times daily for 7 days. 14 tablet 0    colchicine 0.6 MG Oral Tab Take 1 tablet (0.6 mg total) by mouth in the morning, at noon, and at bedtime. (Patient not taking: Reported on 12/4/2023) 15 tablet 0    PEG 3350-KCl-Na Bicarb-NaCl 420 g Oral Recon Soln Take as directed per MD 4000 mL 0    Meloxicam 15 MG Oral Tab Take 1 tablet (15 mg total) by mouth daily. (Patient not taking: Reported on 12/4/2023) 30 tablet 1    Diclofenac Sodium (VOLTAREN) 1 % Transdermal Gel Apply 2 g topically 4 (four) times daily. (Patient not taking: Reported on 12/4/2023) 1 Tube 3         EXAM:   Objective   Objective    Physical Exam    Vital Signs  Vitals:    11/13/24 1600   BP: 115/66   Pulse: 116   Resp: 16   Temp: 98.4 °F (36.9 °C)       Wound Assessment  Wound 11/06/24 #3 right medial ankle Ankle Right;Medial (Active)   Date First Assessed/Time First Assessed: 11/06/24 6742     Wound Number (Wound Clinic Only): #3 right medial ankle  Primary Wound Type: Venous Ulcer  Location: Ankle  Wound Location Orientation: Right;Medial      Assessments 11/6/2024  3:18 PM 11/13/2024  4:05 PM   Wound Image       Drainage Amount Scant Small   Drainage Description Serosanguineous Serosanguineous   Treatments Compression (coflex 2 30-40mmhg) --   Wound Length (cm) 4.8 cm 3.2 cm   Wound Width (cm) 2.4 cm 1.4 cm   Wound Surface Area (cm^2) 11.52 cm^2 4.48 cm^2   Wound Depth (cm) 0.1 cm 0.1 cm   Wound Volume (cm^3) 1.152 cm^3 0.448 cm^3   Wound Healing % -- 61   Margins Poorly defined Well-defined edges   Non-staged Wound Description Full thickness Full thickness   Xuan-wound Assessment Hemosiderin staining;Moist;Edema Hemosiderin staining;Moist;Edema   Wound Granulation Tissue Red;Firm --   Wound Bed Granulation (%) 50 % 40 %   Wound Bed Epithelium (%) -- 60 %   Wound Bed Slough (%) 50 % --   Wound Odor None None   Shape -- Bridged   Tunneling? No --   Undermining? No --   Sinus Tracts? No --       No associated orders.       Wound 11/06/24 #4 left anterior leg Leg Left;Anterior (Active)   Date First Assessed/Time First Assessed: 11/06/24 1518    Wound Number (Wound Clinic Only): #4 left anterior leg  Location: Leg  Wound Location Orientation: Left;Anterior      Assessments 11/6/2024  3:20 PM 11/13/2024  4:05 PM   Wound Image       Drainage Amount Unable to assess Unable to assess   Treatments Compression (coflex 2 30-40mmhg) --   Wound Length (cm) 0.1 cm 0.1 cm   Wound Width (cm) 0.2 cm 0.1 cm   Wound Surface Area (cm^2) 0.02 cm^2 0.01 cm^2   Wound Depth (cm) 0.1 cm 0.1 cm   Wound Volume (cm^3) 0.002 cm^3 0.001 cm^3   Wound Healing % -- 50   Margins Well-defined edges Well-defined edges   Non-staged Wound Description Full thickness Full thickness   Xuan-wound Assessment Hemosiderin staining;Edema Hemosiderin staining;Edema   Wound Granulation Tissue Red;Firm Pink;Firm   Wound Bed Granulation (%) 100 % 100 %    Wound Odor None None   Tunneling? No --   Undermining? No --   Sinus Tracts? No --       No associated orders.       Compression Wrap 11/06/24 Ankle Anterior;Right (Active)   Placement Date: 11/06/24   Location: Ankle  Wound Location Orientation: Anterior;Right      Assessments 11/6/2024  5:18 PM   Response to Treatment Well tolerated   Compression Layers Multilayer   Compression Product Type Coflex   Dressing Applied Yes   Compression Wrap Location Toes to Knee   Compression Wrap Status Clean;Dry;Intact       No associated orders.       Compression Wrap 11/06/24 Leg Anterior;Left (Active)   Placement Date: 11/06/24   Location: Leg  Wound Location Orientation: Anterior;Left      Assessments 11/6/2024  5:19 PM   Response to Treatment Well tolerated   Compression Layers Multilayer   Compression Product Type Coflex   Dressing Applied Yes   Compression Wrap Status Clean;Dry;Intact       No associated orders.          ASSESSMENT AND PLAN:     Assessment     Encounter Diagnosis  1. Non-pressure chronic ulcer of right ankle with fat layer exposed (HCC)    2. Pseudomonas infection    3. Infected wound    4. Non-pressure chronic ulcer of left calf with fat layer exposed (HCC)    5. Idiopathic chronic venous hypertension of right lower extremity with ulcer and inflammation (HCC)    6. Pedal edema    7. Lymphedema    8. Sloughing of wound    9. Delayed wound healing      PLAN OF CARE:    Continue gentamicin BID.   Complete course of bactrim.   Need to avoid standing all day at work  Recommend light duty - with option to sit / elevate legs - or take short term disability or FMLA until wound heals - pt not ready for this at this time.   Low salt diet.   Leg elevation  Watch out for signs of worsening infection - counseled.   Plan of care discussed with patient in detail - All questions answered   Return in one week.     Patient Instructions       Continue topical gentamicin BID   Recommend take time off work and elevate lower  extremities. Modification at work for a sitting job until wound closes.   Avoid prolonged standing.   Low salt diet  Leg elevation  See me in one week.     Wound Cleaning and Dressings:    Dressing changes only in the clinic.   Shower with protection - get SHOWER BOOT or CAST COVER from any pharmacy ( [employer] or [facility] [facility] are options).   DRESSINGS: gentamicin / bordered gauze/   Change dressing BID.    Compression Therapy : compression garment.   Compression Therapy Instructions:  1. Okay to wear overnight       2.  Avoid prolonged standing in one place.  It is better to have your calf muscles moving       to pump fluid out of the legs.    3.  Elevate leg(s) above the level of the heart when sitting or as much as possible.    4.  Take your diuretics as directed by your provider.  Do not skip doses or change doses      unless instructed to do so by your provider.    5. Do not get leg(s) with compression wrap wet. If wraps are too tight as indicated        By pain, numbness/tingling or discoloration of toes remove wrap completely       and call the   wound center.     Miscellaneous Instructions:  Supplement with a daily multivitamin   Low salt diet  Increase protein intake / consider protein supplements - see below  Elevate extremities at all times when sitting / laying down.    DIETARY MODIFICATIONS TO HELP WITH WOUND HEALING:    Protein: Meats, beans, eggs, milk and yogurt particularly Greek yogurt), tofu, soy nuts, soy protein products    Vitamin C: Citrus fruits and juices, strawberries, tomatoes, tomato juice, peppers, baked potatoes, spinach, broccoli, cauliflower, [place] sprouts, cabbage    Vitamin A: Dark green, leafy vegetables, orange or yellow vegetables, cantaloupe, fortified dairy products, liver, fortified cereals    Zinc: Fortified cereals, red meats, seafood    Consider [facility] [employer] (These are essential branch chain amino acids that help with tissue building and wound healing) and take 2  packets/day. you can order online at [employer]    ADDITIONAL REMINDERS:    The treatment plan has been discussed at length with you and your provider. Follow all instructions carefully, it is very important. If you do not follow all instructions, you are at  risk of your wound not healing, infection, possible loss of limb and even end of life.  Please call the clinic during regular business hours ( 7:30 AM - 5:30 PM) if you notice increased bleeding, redness, warmth, pain or pus like drainage or start running a fever greater than 100.3.    For after hour emergencies, please call your primary physician or go to the nearest emergency room.      Patient/Caregiver Education: There are no barriers to learning. Medical education for above diagnosis given.   Answered all questions.    Outcome: Patient verbalizes understanding. Patient is notified to call with any questions, complications, allergies, or worsening or changing symptoms.  Patient is to call with any side effects or complications as a result of the treatments today.      DOCUMENTATION OF TIME SPENT: Code selection for this visit was based on time spent : 35 min on date of service in preparing to see the patient, obtaining and/or reviewing separately obtained history, performing a medically appropriate examination, counseling and educating the patient/family/caregiver, ordering medications or testing, referring and communicating with other healthcare providers, documenting clinical information in the E HR, independently interpreting results and communicating results to the patient/family/caregiver and care coordination with the patient's other providers.    Followup: Return in about 1 week (around 11/20/2024) for Wound followup.      Note to Patient:  The 21st Century Cures Act makes medical notes like these available to patients in the interest of transparency. However, be advised this is a medical document and is intended as [id number] communication;  it is written in medical language and may appear blunt, direct, or contain abbreviations or verbiage that are unfamiliar. Medical documents are intended to carry relevant information, facts as evident, and the clinical opinion of the practitioner.    Also, please note that this report has been produced using speech recognition software and may contain errors related to that system including, but not limited to, errors in grammar, punctuation, and spelling, as well as words and phrases that possibly may have been recognized inappropriately.  If there are any questions or concerns, contact the dictating provider for clarification.      Kait Vernon MD  11/13/2024  4:24 PM                      [1] No Known Allergies

## 2024-11-13 NOTE — PATIENT INSTRUCTIONS
Continue topical gentamicin BID   Recommend take time off work and elevate lower extremities. Modification at work for a sitting job until wound closes.   Avoid prolonged standing.   Low salt diet  Leg elevation  See me in one week.     Wound Cleaning and Dressings:    Dressing changes only in the clinic.   Shower with protection - get SHOWER BOOT or CAST COVER from any pharmacy ( [employer] or [facility] are options).   DRESSINGS: gentamicin / bordered gauze/   Change dressing BID.    Compression Therapy : compression garment.   Compression Therapy Instructions:  1. Okay to wear overnight       2.  Avoid prolonged standing in one place.  It is better to have your calf muscles moving       to pump fluid out of the legs.    3.  Elevate leg(s) above the level of the heart when sitting or as much as possible.    4.  Take your diuretics as directed by your provider.  Do not skip doses or change doses      unless instructed to do so by your provider.    5. Do not get leg(s) with compression wrap wet. If wraps are too tight as indicated        By pain, numbness/tingling or discoloration of toes remove wrap completely       and call the   wound center.     Miscellaneous Instructions:  Supplement with a daily multivitamin   Low salt diet  Increase protein intake / consider protein supplements - see below  Elevate extremities at all times when sitting / laying down.    DIETARY MODIFICATIONS TO HELP WITH WOUND HEALING:    Protein: Meats, beans, eggs, milk and yogurt particularly Greek yogurt), tofu, soy nuts, soy protein products    Vitamin C: Citrus fruits and juices, strawberries, tomatoes, tomato juice, peppers, baked potatoes, spinach, broccoli, cauliflower, [place] sprouts, cabbage    Vitamin A: Dark green, leafy vegetables, orange or yellow vegetables, cantaloupe, fortified dairy products, liver, fortified cereals    Zinc: Fortified cereals, red meats, seafood    Consider Iván by [employer] (These are essential  branch chain amino acids that help with tissue building and wound healing) and take 2 packets/day. you can order online at [employer] or [email or web address]    ADDITIONAL REMINDERS:    The treatment plan has been discussed at length with you and your provider. Follow all instructions carefully, it is very important. If you do not follow all instructions, you are at  risk of your wound not healing, infection, possible loss of limb and even end of life.  Please call the clinic during regular business hours ( 7:30 AM - 5:30 PM) if you notice increased bleeding, redness, warmth, pain or pus like drainage or start running a fever greater than 100.3.    For after hour emergencies, please call your primary physician or go to the nearest emergency room.

## 2024-11-13 NOTE — PROGRESS NOTES
Weekly Wound Education Note    Teaching Provided To: Patient  Training Topics: Cleasing and general instructions;Compression;Discharge instructions;Dressing;Edema control  Training Method: Explain/Verbal  Training Response: Patient responds and understands;Reinforcement needed        Notes: Improving but pain states the pain \"is still there\". He arrived with no compression stocking on, reminded to wear compression stockings, elevated legs as much as possible, limit salt in diet. Provider also recommends he make an appointment with vascular. Continue gentamicin, bordered gauze, spandagrip E or patient's own compression stocking. Ordered compression garment from [employer].

## 2024-11-20 ENCOUNTER — OFFICE VISIT (OUTPATIENT)
Dept: WOUND CARE | Facility: HOSPITAL | Age: 54
End: 2024-11-20
Attending: INTERNAL MEDICINE
Payer: COMMERCIAL

## 2024-11-20 VITALS
DIASTOLIC BLOOD PRESSURE: 98 MMHG | HEART RATE: 88 BPM | TEMPERATURE: 98 F | SYSTOLIC BLOOD PRESSURE: 140 MMHG | RESPIRATION RATE: 16 BRPM

## 2024-11-20 DIAGNOSIS — I89.0 LYMPHEDEMA: ICD-10-CM

## 2024-11-20 DIAGNOSIS — R60.0 PEDAL EDEMA: ICD-10-CM

## 2024-11-20 DIAGNOSIS — L08.9 INFECTED WOUND: ICD-10-CM

## 2024-11-20 DIAGNOSIS — T14.8XXA INFECTED WOUND: ICD-10-CM

## 2024-11-20 DIAGNOSIS — R23.8 SLOUGHING OF WOUND: ICD-10-CM

## 2024-11-20 DIAGNOSIS — A49.8 PSEUDOMONAS INFECTION: ICD-10-CM

## 2024-11-20 DIAGNOSIS — L97.312 NON-PRESSURE CHRONIC ULCER OF RIGHT ANKLE WITH FAT LAYER EXPOSED (HCC): ICD-10-CM

## 2024-11-20 DIAGNOSIS — I87.331 IDIOPATHIC CHRONIC VENOUS HYPERTENSION OF RIGHT LOWER EXTREMITY WITH ULCER AND INFLAMMATION (HCC): ICD-10-CM

## 2024-11-20 DIAGNOSIS — T14.8XXD DELAYED WOUND HEALING: ICD-10-CM

## 2024-11-20 DIAGNOSIS — L97.222 NON-PRESSURE CHRONIC ULCER OF LEFT CALF WITH FAT LAYER EXPOSED (HCC): Primary | ICD-10-CM

## 2024-11-20 PROCEDURE — 29581 APPL MULTLAYER CMPRN SYS LEG: CPT

## 2024-11-20 NOTE — PROGRESS NOTES
Weekly Wound Education Note    Teaching Provided To: Patient  Training Topics: Cleasing and general instructions;Compression;Discharge instructions;Dressing;Edema control  Training Method: Explain/Verbal  Training Response: Patient responds and understands;Reinforcement needed        Notes: Stable. Gentamicin, hydrofera transfer, kerramax to right ankle. Silver foam to left leg. Coflex 2 30-40mmhg BLE. RN spoke to sister-in-law on the phone - discussed POC and what can be done to help prevent wounds in the future. Aslo recommending vascular appointment. Pt brought in compression garments - advised to bring to next visit.

## 2024-11-20 NOTE — PATIENT INSTRUCTIONS
Continue topical gentamicin on wound base under compression wraps  Recommend take time off work and elevate lower extremities. Modification at work for a sitting job until wound closes.   Avoid prolonged standing.   Low salt diet  Leg elevation  See me in one week.     Wound Cleaning and Dressings:    Dressing changes only in the clinic.   Shower with protection - get SHOWER BOOT or CAST COVER from any pharmacy ( [employer] or [facility] are options).   DRESSINGS: gentamicin / HF transfer   Zinc barrier cream periwound.   Change dressing weekly.     Compression Therapy : compression wraps - COFLEX -2  Compression Therapy Instructions:  1. Okay to wear overnight       2.  Avoid prolonged standing in one place.  It is better to have your calf muscles moving       to pump fluid out of the legs.    3.  Elevate leg(s) above the level of the heart when sitting or as much as possible.    4.  Take your diuretics as directed by your provider.  Do not skip doses or change doses      unless instructed to do so by your provider.    5. Do not get leg(s) with compression wrap wet. If wraps are too tight as indicated        By pain, numbness/tingling or discoloration of toes remove wrap completely       and call the   wound center.     Miscellaneous Instructions:  Supplement with a daily multivitamin   Low salt diet  Increase protein intake / consider protein supplements - see below  Elevate extremities at all times when sitting / laying down.    DIETARY MODIFICATIONS TO HELP WITH WOUND HEALING:    Protein: Meats, beans, eggs, milk and yogurt particularly Greek yogurt), tofu, soy nuts, soy protein products    Vitamin C: Citrus fruits and juices, strawberries, tomatoes, tomato juice, peppers, baked potatoes, spinach, broccoli, cauliflower, [place] sprouts, cabbage    Vitamin A: Dark green, leafy vegetables, orange or yellow vegetables, cantaloupe, fortified dairy products, liver, fortified cereals    Zinc: Fortified cereals,  red meats, seafood    Consider Iván by [employer] (These are essential branch chain amino acids that help with tissue building and wound healing) and take 2 packets/day. you can order online at [employer] or [employer]    ADDITIONAL REMINDERS:    The treatment plan has been discussed at length with you and your provider. Follow all instructions carefully, it is very important. If you do not follow all instructions, you are at  risk of your wound not healing, infection, possible loss of limb and even end of life.  Please call the clinic during regular business hours ( 7:30 AM - 5:30 PM) if you notice increased bleeding, redness, warmth, pain or pus like drainage or start running a fever greater than 100.3.    For after hour emergencies, please call your primary physician or go to the nearest emergency room.

## 2024-11-20 NOTE — PROGRESS NOTES
[facility] PROGRESS NOTE  ETELVINA BLANTON MD  11/20/2024    Chief Complaint:   Chief Complaint   Patient presents with    Wound Care     Patient here for follow up. He states he continues to have pain to the wounds. Patient brought his velcro compression wraps with him to today's visit.       HPI:   Subjective   Pricilla Camejo is a 54 year old male coming in for a follow-up visit.    HPI    Wound improving.   Has been using topical gentamicin.   No s/o infection now.   Will resume compression wraps.   Pt ahs received compression garment in mail.     He is not able to take time OFF work     Review of Systems  Negative except HPI   Denies chest pain / SOB / palpitations  Denies fever.     Allergies  Allergies[1]    Current Meds:  Current Outpatient Medications   Medication Sig Dispense Refill    gentamicin 0.1 % External Ointment Apply 1 Application topically 3 (three) times daily. 30 g 3    colchicine 0.6 MG Oral Tab Take 1 tablet (0.6 mg total) by mouth in the morning, at noon, and at bedtime. (Patient not taking: Reported on 12/4/2023) 15 tablet 0    PEG 3350-KCl-Na Bicarb-NaCl 420 g Oral Recon Soln Take as directed per MD 4000 mL 0    Meloxicam 15 MG Oral Tab Take 1 tablet (15 mg total) by mouth daily. (Patient not taking: Reported on 12/4/2023) 30 tablet 1    Diclofenac Sodium (VOLTAREN) 1 % Transdermal Gel Apply 2 g topically 4 (four) times daily. (Patient not taking: Reported on 12/4/2023) 1 Tube 3         EXAM:   Objective   Objective    Physical Exam    Vital Signs  Vitals:    11/20/24 1330   BP: (!) 140/98   Pulse: 88   Resp: 16   Temp: 98.1 °F (36.7 °C)       Wound Assessment  Wound 11/06/24 #3 right medial ankle Ankle Right;Medial (Active)   Date First Assessed/Time First Assessed: 11/06/24 1517    Wound Number (Wound Clinic Only): #3 right medial ankle  Primary Wound Type: Venous Ulcer  Location: Ankle  Wound Location Orientation: Right;Medial      Assessments 11/6/2024  3:18 PM 11/20/2024   1:33 PM   Wound Image       Drainage Amount Scant Small   Drainage Description Serosanguineous Serous;Yellow   Treatments Compression (coflex 2 30-40mmhg) --   Wound Length (cm) 4.8 cm 4.3 cm   Wound Width (cm) 2.4 cm 2.3 cm   Wound Surface Area (cm^2) 11.52 cm^2 9.89 cm^2   Wound Depth (cm) 0.1 cm 0.1 cm   Wound Volume (cm^3) 1.152 cm^3 0.989 cm^3   Wound Healing % -- 14   Margins Poorly defined Well-defined edges   Non-staged Wound Description Full thickness Full thickness   Xuan-wound Assessment Hemosiderin staining;Moist;Edema Hemosiderin staining;Moist;Edema;Maceration   Wound Granulation Tissue Red;Firm Pink;Firm   Wound Bed Granulation (%) 50 % 50 %   Wound Bed Slough (%) 50 % 50 %   Wound Odor None None   Tunneling? No --   Undermining? No --   Sinus Tracts? No --       No associated orders.       Wound 11/06/24 #4 left anterior leg Leg Left;Anterior (Active)   Date First Assessed/Time First Assessed: 11/06/24 1518    Wound Number (Wound Clinic Only): #4 left anterior leg  Location: Leg  Wound Location Orientation: Left;Anterior      Assessments 11/6/2024  3:20 PM 11/20/2024  1:34 PM   Wound Image       Drainage Amount Unable to assess Scant   Drainage Description -- Yellow;Serous   Treatments Compression (coflex 2 30-40mmhg) --   Wound Length (cm) 0.1 cm 0.1 cm   Wound Width (cm) 0.2 cm 0.1 cm   Wound Surface Area (cm^2) 0.02 cm^2 0.01 cm^2   Wound Depth (cm) 0.1 cm 0.1 cm   Wound Volume (cm^3) 0.002 cm^3 0.001 cm^3   Wound Healing % -- 50   Margins Well-defined edges Well-defined edges   Non-staged Wound Description Full thickness Full thickness   Xuan-wound Assessment Hemosiderin staining;Edema Hemosiderin staining;Edema   Wound Granulation Tissue Red;Firm Pink;Firm   Wound Bed Granulation (%) 100 % 100 %   Wound Odor None None   Tunneling? No --   Undermining? No --   Sinus Tracts? No --       No associated orders.          ASSESSMENT AND PLAN:     Assessment     Encounter Diagnosis  1. 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 Idiopathic chronic venous hypertension of right lower extremity with ulcer and inflammation (HCC)    4. Pedal edema    5. Lymphedema    6. Infected wound    7. Pseudomonas infection    8. Sloughing of wound    9. Delayed wound healing      PROCEDURES:    Mechanical debridement using moist gauze to remove superficial nonviable tissue from wound base.   No bleeding response.     Compression wrap application    PLAN OF CARE:    Resume  absorptive dressings and compression wraps.   Will transition to compression garment in 1-2 weeks.   Low salt diet  Leg elevation.   Consider work modification to avoid prolonged standing.   Watch out for signs of early infection - counseled.   Plan of care discussed with patient in detail - All questions answered   Return in one week.     Patient Instructions       Continue topical gentamicin on wound base under compression wraps  Recommend take time off work and elevate lower extremities. Modification at work for a sitting job until wound closes.   Avoid prolonged standing.   Low salt diet  Leg elevation  See me in one week.     Wound Cleaning and Dressings:    Dressing changes only in the clinic.   Shower with protection - get SHOWER BOOT or CAST COVER from any pharmacy ( [employer] or [employer] are options).   DRESSINGS: gentamicin / HF transfer   Zinc barrier cream periwound.   Change dressing weekly.     Compression Therapy : compression wraps - COFLEX -2  Compression Therapy Instructions:  1. Okay to wear overnight       2.  Avoid prolonged standing in one place.  It is better to have your calf muscles moving       to pump fluid out of the legs.    3.  Elevate leg(s) above the level of the heart when sitting or as much as possible.    4.  Take your diuretics as directed by your provider.  Do not skip doses or change doses      unless  instructed to do so by your provider.    5. Do not get leg(s) with compression wrap wet. If wraps are too tight as indicated        By pain, numbness/tingling or discoloration of toes remove wrap completely       and call the   wound center.     Miscellaneous Instructions:  Supplement with a daily multivitamin   Low salt diet  Increase protein intake / consider protein supplements - see below  Elevate extremities at all times when sitting / laying down.    DIETARY MODIFICATIONS TO HELP WITH WOUND HEALING:    Protein: Meats, beans, eggs, milk and yogurt particularly Greek yogurt), tofu, soy nuts, soy protein products    Vitamin C: Citrus fruits and juices, strawberries, tomatoes, tomato juice, peppers, baked potatoes, spinach, broccoli, cauliflower, [place] sprouts, cabbage    Vitamin A: Dark green, leafy vegetables, orange or yellow vegetables, cantaloupe, fortified dairy products, liver, fortified cereals    Zinc: Fortified cereals, red meats, seafood    Consider Iván by [employer] (These are essential branch chain amino acids that help with tissue building and wound healing) and take 2 packets/day. you can order online at [employer] or [employer]    ADDITIONAL REMINDERS:    The treatment plan has been discussed at length with you and your provider. Follow all instructions carefully, it is very important. If you do not follow all instructions, you are at  risk of your wound not healing, infection, possible loss of limb and even end of life.  Please call the clinic during regular business hours ( 7:30 AM - 5:30 PM) if you notice increased bleeding, redness, warmth, pain or pus like drainage or start running a fever greater than 100.3.    For after hour emergencies, please call your primary physician or go to the nearest emergency room.      Patient/Caregiver Education: There are no barriers to learning. Medical education for above diagnosis given.   Answered all questions.    Outcome: Patient verbalizes understanding.  Patient is notified to call with any questions, complications, allergies, or worsening or changing symptoms.  Patient is to call with any side effects or complications as a result of the treatments today.      DOCUMENTATION OF TIME SPENT: Code selection for this visit was based on time spent : 30 min on date of service in preparing to see the patient, obtaining and/or reviewing separately obtained history, performing a medically appropriate examination, counseling and educating the patient/family/caregiver, ordering medications or testing, referring and communicating with other healthcare providers, documenting clinical information in the E HR, independently interpreting results and communicating results to the patient/family/caregiver and care coordination with the patient's other providers.    Followup: Return in about 1 week (around 11/27/2024) for Wound followup.      Note to Patient:  The 21st Century Cures Act makes medical notes like these available to patients in the interest of transparency. However, be advised this is a medical document and is intended as [id number] communication; it is written in medical language and may appear blunt, direct, or contain abbreviations or verbiage that are unfamiliar. Medical documents are intended to carry relevant information, facts as evident, and the clinical opinion of the practitioner.    Also, please note that this report has been produced using speech recognition software and may contain errors related to that system including, but not limited to, errors in grammar, punctuation, and spelling, as well as words and phrases that possibly may have been recognized inappropriately.  If there are any questions or concerns, contact the dictating provider for clarification.      Kait Vernon MD  11/20/2024  1:52 PM                      [1] No Known Allergies

## 2024-11-27 ENCOUNTER — OFFICE VISIT (OUTPATIENT)
Dept: WOUND CARE | Facility: HOSPITAL | Age: 54
End: 2024-11-27
Attending: INTERNAL MEDICINE
Payer: COMMERCIAL

## 2024-11-27 VITALS
SYSTOLIC BLOOD PRESSURE: 144 MMHG | TEMPERATURE: 99 F | DIASTOLIC BLOOD PRESSURE: 88 MMHG | RESPIRATION RATE: 16 BRPM | HEART RATE: 98 BPM

## 2024-11-27 DIAGNOSIS — R23.8 SLOUGHING OF WOUND: ICD-10-CM

## 2024-11-27 DIAGNOSIS — I89.0 LYMPHEDEMA: ICD-10-CM

## 2024-11-27 DIAGNOSIS — L08.9 INFECTED WOUND: ICD-10-CM

## 2024-11-27 DIAGNOSIS — I87.331 IDIOPATHIC CHRONIC VENOUS HYPERTENSION OF RIGHT LOWER EXTREMITY WITH ULCER AND INFLAMMATION (HCC): ICD-10-CM

## 2024-11-27 DIAGNOSIS — T14.8XXA INFECTED WOUND: ICD-10-CM

## 2024-11-27 DIAGNOSIS — T14.8XXD DELAYED WOUND HEALING: ICD-10-CM

## 2024-11-27 DIAGNOSIS — L97.222 NON-PRESSURE CHRONIC ULCER OF LEFT CALF WITH FAT LAYER EXPOSED (HCC): Primary | ICD-10-CM

## 2024-11-27 DIAGNOSIS — L97.312 NON-PRESSURE CHRONIC ULCER OF RIGHT ANKLE WITH FAT LAYER EXPOSED (HCC): ICD-10-CM

## 2024-11-27 DIAGNOSIS — R60.0 PEDAL EDEMA: ICD-10-CM

## 2024-11-27 PROCEDURE — 99214 OFFICE O/P EST MOD 30 MIN: CPT

## 2024-11-27 NOTE — PROGRESS NOTES
[facility] PROGRESS NOTE  ETELVINA BLANTON MD  11/27/2024    Chief Complaint:   Chief Complaint   Patient presents with    Wound Recheck     Pt arrives for wound care follow-up with BLE in coflex wraps. He has brought personal compression as well. He rates his pain 1/10. No new questions or concerns this visit.       HPI:   Subjective   Pricilla Camejo is a 54 year old male coming in for a follow-up visit.    HPI    Wound closed.   Skin mature.   No s/o infection.   Edema stable.     Review of Systems  Negative except HPI   Denies chest pain / SOB / palpitations  Denies fever.     Allergies  Allergies[1]    Current Meds:  Current Outpatient Medications   Medication Sig Dispense Refill    gentamicin 0.1 % External Ointment Apply 1 Application topically 3 (three) times daily. 30 g 3    colchicine 0.6 MG Oral Tab Take 1 tablet (0.6 mg total) by mouth in the morning, at noon, and at bedtime. (Patient not taking: Reported on 12/4/2023) 15 tablet 0    PEG 3350-KCl-Na Bicarb-NaCl 420 g Oral Recon Soln Take as directed per MD 4000 mL 0    Meloxicam 15 MG Oral Tab Take 1 tablet (15 mg total) by mouth daily. (Patient not taking: Reported on 12/4/2023) 30 tablet 1    Diclofenac Sodium (VOLTAREN) 1 % Transdermal Gel Apply 2 g topically 4 (four) times daily. (Patient not taking: Reported on 12/4/2023) 1 Tube 3         EXAM:   Objective   Objective    Physical Exam    Vital Signs  Vitals:    11/27/24 1507   BP: 144/88   Pulse: 98   Resp: 16   Temp: 98.6 °F (37 °C)       Wound Assessment  Wound 11/06/24 #3 right medial ankle Ankle Right;Medial (Active)   Date First Assessed/Time First Assessed: 11/06/24 1517    Wound Number (Wound Clinic Only): #3 right medial ankle  Primary Wound Type: Venous Ulcer  Location: Ankle  Wound Location Orientation: Right;Medial      Assessments 11/6/2024  3:18 PM 11/27/2024  3:10 PM   Wound Image       Drainage Amount Scant Scant   Drainage Description Serosanguineous Serosanguineous    Treatments Compression (coflex 2 30-40mmhg) --   Wound Length (cm) 4.8 cm 1 cm   Wound Width (cm) 2.4 cm 0.2 cm   Wound Surface Area (cm^2) 11.52 cm^2 0.2 cm^2   Wound Depth (cm) 0.1 cm 0 cm   Wound Volume (cm^3) 1.152 cm^3 0 cm^3   Wound Healing % -- 100   Margins Poorly defined Well-defined edges   Non-staged Wound Description Full thickness Full thickness   Xuan-wound Assessment Hemosiderin staining;Moist;Edema Dry;Hemosiderin staining;Painful   Wound Granulation Tissue Red;Firm --   Wound Bed Granulation (%) 50 % --   Wound Bed Epithelium (%) -- 75 %   Wound Bed Slough (%) 50 % --   Wound Odor None None   Shape -- bridged, 25% scabbed   Tunneling? No No   Undermining? No No   Sinus Tracts? No No       No associated orders.       Wound 11/06/24 #4 left anterior leg Leg Left;Anterior (Active)   Date First Assessed/Time First Assessed: 11/06/24 1518    Wound Number (Wound Clinic Only): #4 left anterior leg  Location: Leg  Wound Location Orientation: Left;Anterior      Assessments 11/6/2024  3:20 PM 11/27/2024  3:10 PM   Wound Image       Drainage Amount Unable to assess None   Treatments Compression (coflex 2 30-40mmhg) --   Wound Length (cm) 0.1 cm 0 cm   Wound Width (cm) 0.2 cm 0 cm   Wound Surface Area (cm^2) 0.02 cm^2 0 cm^2   Wound Depth (cm) 0.1 cm 0 cm   Wound Volume (cm^3) 0.002 cm^3 0 cm^3   Wound Healing % -- 100   Margins Well-defined edges Well-defined edges   Non-staged Wound Description Full thickness Full thickness   Xuan-wound Assessment Hemosiderin staining;Edema Hemosiderin staining;Edema   Wound Granulation Tissue Red;Firm --   Wound Bed Granulation (%) 100 % --   Wound Bed Epithelium (%) -- 100 %   Wound Odor None None   Tunneling? No No   Undermining? No No   Sinus Tracts? No No       No associated orders.       Compression Wrap 11/20/24 Ankle Anterior;Right (Active)   Placement Date: 11/20/24   Location: Ankle  Wound Location Orientation: Anterior;Right      No assessment data to display        No associated orders.       Compression Wrap 11/20/24 Leg Anterior;Left (Active)   Placement Date: 11/20/24   Location: Leg  Wound Location Orientation: Anterior;Left      No assessment data to display       No associated orders.          ASSESSMENT AND PLAN:     Assessment     Encounter Diagnosis  1. Non-pressure chronic ulcer of left calf with fat layer exposed (HCC)    2. 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 Idiopathic chronic venous hypertension of right lower extremity with ulcer and inflammation (HCC)    4. Pedal edema    5. Lymphedema    6. Sloughing of wound    7. Delayed wound healing    8. Infected wound      PLAN OF CARE:    Apply silver foam for protection over healed area.   Wear compression garment at all times.   Low salt diet - leg elevation - no prolonged standing - consider job modification.   Watch out for signs of early infection - counseled.   Plan of care discussed with patient in detail - All questions answered   Return as needed          Patient Instructions       Modification at work for a sitting job recommended to prevent reopening  Avoid prolonged standing.   Low salt diet  Leg elevation  See me  as needed    Wound Cleaning and Dressings:    Ok to shower.   Silver foam for protection in the previously healed areas.   Wear compression garment at all times when not in bed    Compression Therapy : compression  garment  Compression Therapy Instructions:  1. Okay to wear overnight       2.  Avoid prolonged standing in one place.  It is better to have your calf muscles moving       to pump fluid out of the legs.    3.  Elevate leg(s) above the level of the heart when sitting or as much as possible.    4.  Take your diuretics as directed by your provider.  Do not skip doses or change doses      unless instructed to do so by your provider.    5. Do not get leg(s) with compression wrap wet. If wraps are too tight as indicated        By pain,  numbness/tingling or discoloration of toes remove wrap completely       and call the   wound center.     Miscellaneous Instructions:  Supplement with a daily multivitamin   Low salt diet  Increase protein intake / consider protein supplements - see below  Elevate extremities at all times when sitting / laying down.    DIETARY MODIFICATIONS TO HELP WITH WOUND HEALING:    Protein: Meats, beans, eggs, milk and yogurt particularly Greek yogurt), tofu, soy nuts, soy protein products    Vitamin C: Citrus fruits and juices, strawberries, tomatoes, tomato juice, peppers, baked potatoes, spinach, broccoli, cauliflower, [place] sprouts, cabbage    Vitamin A: Dark green, leafy vegetables, orange or yellow vegetables, cantaloupe, fortified dairy products, liver, fortified cereals    Zinc: Fortified cereals, red meats, seafood    Consider Iván by [employer] (These are essential branch chain amino acids that help with tissue building and wound healing) and take 2 packets/day. you can order online at [employer] or [employer]    ADDITIONAL REMINDERS:    The treatment plan has been discussed at length with you and your provider. Follow all instructions carefully, it is very important. If you do not follow all instructions, you are at  risk of your wound not healing, infection, possible loss of limb and even end of life.  Please call the clinic during regular business hours ( 7:30 AM - 5:30 PM) if you notice increased bleeding, redness, warmth, pain or pus like drainage or start running a fever greater than 100.3.    For after hour emergencies, please call your primary physician or go to the nearest emergency room.      Patient/Caregiver Education: There are no barriers to learning. Medical education for above diagnosis given.   Answered all questions.    Outcome: Patient verbalizes understanding. Patient is notified to call with any questions, complications, allergies, or worsening or changing symptoms.  Patient is to call with any side  effects or complications as a result of the treatments today.      DOCUMENTATION OF TIME SPENT: Code selection for this visit was based on time spent : 25 min on date of service in preparing to see the patient, obtaining and/or reviewing separately obtained history, performing a medically appropriate examination, counseling and educating the patient/family/caregiver, ordering medications or testing, referring and communicating with other healthcare providers, documenting clinical information in the E HR, independently interpreting results and communicating results to the patient/family/caregiver and care coordination with the patient's other providers.    Followup: Return for if wound reopens - Discharge from Wound clinic..      Note to Patient:  The 21st Century Cures Act makes medical notes like these available to patients in the interest of transparency. However, be advised this is a medical document and is intended as [id number] communication; it is written in medical language and may appear blunt, direct, or contain abbreviations or verbiage that are unfamiliar. Medical documents are intended to carry relevant information, facts as evident, and the clinical opinion of the practitioner.    Also, please note that this report has been produced using speech recognition software and may contain errors related to that system including, but not limited to, errors in grammar, punctuation, and spelling, as well as words and phrases that possibly may have been recognized inappropriately.  If there are any questions or concerns, contact the dictating provider for clarification.      Kait Vernon MD  11/27/2024  3:39 PM                      [1] No Known Allergies

## 2024-11-27 NOTE — PROGRESS NOTES
Weekly Wound Education Note    Teaching Provided To: Patient  Training Topics: Cleasing and general instructions;Compression;Discharge instructions;Dressing;Edema control  Training Method: Explain/Verbal  Training Response: Patient responds and understands;Reinforcement needed        Notes: Per provider wounds are healed. Silver foam applied to protect areas for a couple more weeks. Transitioned into liner and velcro compression garment. Educated on importance of compression. He asked about his 2 layer compression stocking, can he wear those? Recommended her were the velcro garment with liner when at work and if he is having a relaxing day/ off of work ok to use 2 layer medivan stockings. Continue to follow-up with vascular doctor, elevatedc legs as much as possible and limit salt in diet. Advised to call clinic if there are any questions or concerns. Asked if RN should call sister in law to discuss today's visit he declined - stating she is calling vascular doctor and is ok.

## 2024-11-27 NOTE — PATIENT INSTRUCTIONS
Modification at work for a sitting job recommended to prevent reopening  Avoid prolonged standing.   Low salt diet  Leg elevation  See me  as needed    Wound Cleaning and Dressings:    Ok to shower.   Silver foam for protection in the previously healed areas.   Wear compression garment at all times when not in bed    Compression Therapy : compression  garment  Compression Therapy Instructions:  1. Okay to wear overnight       2.  Avoid prolonged standing in one place.  It is better to have your calf muscles moving       to pump fluid out of the legs.    3.  Elevate leg(s) above the level of the heart when sitting or as much as possible.    4.  Take your diuretics as directed by your provider.  Do not skip doses or change doses      unless instructed to do so by your provider.    5. Do not get leg(s) with compression wrap wet. If wraps are too tight as indicated        By pain, numbness/tingling or discoloration of toes remove wrap completely       and call the   wound center.     Miscellaneous Instructions:  Supplement with a daily multivitamin   Low salt diet  Increase protein intake / consider protein supplements - see below  Elevate extremities at all times when sitting / laying down.    DIETARY MODIFICATIONS TO HELP WITH WOUND HEALING:    Protein: Meats, beans, eggs, milk and yogurt particularly Greek yogurt), tofu, soy nuts, soy protein products    Vitamin C: Citrus fruits and juices, strawberries, tomatoes, tomato juice, peppers, baked potatoes, spinach, broccoli, cauliflower, [place] sprouts, cabbage    Vitamin A: Dark green, leafy vegetables, orange or yellow vegetables, cantaloupe, fortified dairy products, liver, fortified cereals    Zinc: Fortified cereals, red meats, seafood    Consider Iván by [employer] (These are essential branch chain amino acids that help with tissue building and wound healing) and take 2 packets/day. you can order online at [employer] or [employer]    ADDITIONAL REMINDERS:    The  treatment plan has been discussed at length with you and your provider. Follow all instructions carefully, it is very important. If you do not follow all instructions, you are at  risk of your wound not healing, infection, possible loss of limb and even end of life.  Please call the clinic during regular business hours ( 7:30 AM - 5:30 PM) if you notice increased bleeding, redness, warmth, pain or pus like drainage or start running a fever greater than 100.3.    For after hour emergencies, please call your primary physician or go to the nearest emergency room.

## 2024-12-04 ENCOUNTER — APPOINTMENT (OUTPATIENT)
Dept: WOUND CARE | Facility: HOSPITAL | Age: 54
End: 2024-12-04
Attending: INTERNAL MEDICINE
Payer: COMMERCIAL

## 2024-12-11 ENCOUNTER — APPOINTMENT (OUTPATIENT)
Dept: WOUND CARE | Facility: HOSPITAL | Age: 54
End: 2024-12-11
Attending: INTERNAL MEDICINE
Payer: COMMERCIAL

## 2024-12-18 ENCOUNTER — APPOINTMENT (OUTPATIENT)
Dept: WOUND CARE | Facility: HOSPITAL | Age: 54
End: 2024-12-18
Attending: INTERNAL MEDICINE
Payer: COMMERCIAL

## (undated) NOTE — LETTER
Date: 1/3/2024  Patient name: Pricilla Camejo  YOB: 1970  Medical Record Number: [id number]  Primary Coverage: Payor: BCBS OUT OF STATE / Plan: BCBS OUT OF STATE PPO / Product Type: *No Product type* /   Secondary Coverage:   Insurance ID: [id number]  Patient Address: [address] [place] [address]  Telephone Information:   Home Phone [phone]   Work Phone [phone]   Mobile [phone]         Encounter Date: 1/3/2024  Provider: Etelvina Blanton MD  Diagnosis:     ICD-10-CM   1. Non-pressure chronic ulcer of right calf with fat layer exposed (HCC)  L97.212   2. Non-pressure chronic ulcer of right ankle with fat layer exposed (HCC)  L97.312   3. Idiopathic chronic venous hypertension of right lower extremity with ulcer and inflammation (HCC)  I87.331   4. Pedal edema  R60.0   5. Lymphedema  I89.0       Progress Note:  [facility] PROGRESS NOTE  ETELVINA BLANTON MD  1/3/2024    Chief Complaint:   Chief Complaint   Patient presents with    Wound Recheck     Pt here for follow up for right leg wound. He arrives with compression wrap in place. No new concerns        HPI:   Subjective  Pricilla Camejo is a 53 year old male coming in for a follow-up visit.    HPI    Wound closed with a scab  No s/o infection.  Tolerating wrap ok.     Review of Systems  Negative except HPI   Denies chest pain / SOB / palpitations  Denies fever.     Allergies  No Known Allergies    Current Meds:  Current Outpatient Medications   Medication Sig Dispense Refill    colchicine 0.6 MG Oral Tab Take 1 tablet (0.6 mg total) by mouth in the morning, at noon, and at bedtime. (Patient not taking: Reported on 12/4/2023) 15 tablet 0    PEG 3350-KCl-Na Bicarb-NaCl 420 g Oral Recon Soln Take as directed per MD 4000 mL 0    Meloxicam 15 MG Oral Tab Take 1 tablet (15 mg total) by mouth daily. (Patient not taking: Reported on 12/4/2023) 30 tablet 1    Diclofenac Sodium (VOLTAREN) 1 % Transdermal Gel Apply  2 g topically 4 (four) times daily. (Patient not taking: Reported on 12/4/2023) 1 Tube 3         EXAM:   Objective  Objective    Physical Exam    Vital Signs  Vitals:    01/03/24 1500   BP: (!) 149/95   Pulse: 116   Resp: 14   Temp: 98.6 °F (37 °C)       Wound Assessment  Wound 12/04/23 #2- right anterior leg Right;Anterior (Active)   Wound Image   01/03/24 1517   Drainage Amount Scant 01/03/24 1517   Drainage Description Serosanguineous 01/03/24 1517   Treatments Compression 12/27/23 1459   Dressing Other 12/22/23 1449   Wound Length (cm) 1.1 cm 01/03/24 1517   Wound Width (cm) 0.5 cm 01/03/24 1517   Wound Surface Area (cm^2) 0.55 cm^2 01/03/24 1517   Wound Depth (cm) 0 cm 01/03/24 1517   Wound Volume (cm^3) 0 cm^3 01/03/24 1517   Wound Healing % 100 01/03/24 1517   Margins Well-defined edges 01/03/24 1517   Non-staged Wound Description Full thickness 01/03/24 1517   Xuan-wound Assessment Dry;Hemosiderin staining;Edema 01/03/24 1517   Wound Granulation Tissue Red;Pink;Spongy 12/27/23 1459   Wound Bed Granulation (%) 100 % 12/27/23 1459   Wound Bed Slough (%) 10 % 12/11/23 0813   Wound Odor None 01/03/24 1517   Shape 100% scab 01/03/24 1517   Tunneling? No 01/03/24 1517   Undermining? No 01/03/24 1517   Sinus Tracts? No 01/03/24 1517       Compression Wrap 12/04/23 Ankle Right (Active)   Response to Treatment Well tolerated 12/27/23 1520   Compression Layers Multilayer 12/27/23 1520   Compression Product Type Unna Boot 12/27/23 1520   Dressing Applied Yes 12/27/23 1520   Compression Wrap Location Toes to Knee 12/27/23 1520   Compression Wrap Status Dry;Clean;Intact 12/27/23 1520           ASSESSMENT AND PLAN:     Assessment  Assessment    Encounter Diagnosis  1. Non-pressure chronic ulcer of right calf with fat layer exposed (HCC)    2. 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 Idiopathic chronic venous hypertension of right lower extremity with ulcer and inflammation (HCC)    4.  Pedal edema    5. Lymphedema        Problem List  Patient Active Problem List   Diagnosis    Right knee pain, unspecified chronicity    Prepatellar bursitis, left knee    Injury of tendon of long head of left biceps    Sprain of left acromioclavicular joint    Labral tear of long head of left biceps tendon    Chronic midline low back pain without sciatica    s/p Yobani/Nathaniel bunionectomy right foot on 08/30/21. BASILIO 11/28/21.         MANAGEMENT    PROCEDURES:    Compression wrap applied after dressing change     Patient Instructions     Low salt diet  Leg elevation  Modification at work for a sitting job until wound closes.   See me in one week.     Wound Cleaning and Dressings:    Dressing changes only in the clinic.   Shower with protection - get SHOWER BOOT or CAST COVER from any pharmacy ( [email or web address] or [employer] are options).   DRESSINGS: rocío / HF transfer / kerramax /   Change dressing only in the clinic.     Compression Therapy : coflex-2  Compression Therapy Instructions:  1. Okay to wear overnight       2.  Avoid prolonged standing in one place.  It is better to have your calf muscles moving       to pump fluid out of the legs.    3.  Elevate leg(s) above the level of the heart when sitting or as much as possible.    4.  Take your diuretics as directed by your provider.  Do not skip doses or change doses      unless instructed to do so by your provider.    5. Do not get leg(s) with compression wrap wet. If wraps are too tight as indicated        By pain, numbness/tingling or discoloration of toes remove wrap completely       and call the   wound center.     Miscellaneous Instructions:  Supplement with a daily multivitamin   Low salt diet  Increase protein intake / consider protein supplements - see below  Elevate extremities at all times when sitting / laying down.    DIETARY MODIFICATIONS TO HELP WITH WOUND HEALING:    Protein: Meats, beans, eggs, milk and yogurt particularly Greek yogurt), tofu, soy  nuts, soy protein products    Vitamin C: Citrus fruits and juices, strawberries, tomatoes, tomato juice, peppers, baked potatoes, spinach, broccoli, cauliflower, [place] sprouts, cabbage    Vitamin A: Dark green, leafy vegetables, orange or yellow vegetables, cantaloupe, fortified dairy products, liver, fortified cereals    Zinc: Fortified cereals, red meats, seafood    Consider Iván by [employer] (These are essential branch chain amino acids that help with tissue building and wound healing) and take 2 packets/day. you can order online at [employer] or [employer]    ADDITIONAL REMINDERS:    The treatment plan has been discussed at length with you and your provider. Follow all instructions carefully, it is very important. If you do not follow all instructions, you are at  risk of your wound not healing, infection, possible loss of limb and even end of life.  Please call the clinic during regular business hours ( 7:30 AM - 5:30 PM) if you notice increased bleeding, redness, warmth, pain or pus like drainage or start running a fever greater than 100.3.    For after hour emergencies, please call your primary physician or go to the nearest emergency room.    Patient/Caregiver Education: There are no barriers to learning. Medical education for above diagnosis given.   Answered all questions.    Outcome: Patient verbalizes understanding. Patient is notified to call with any questions, complications, allergies, or worsening or changing symptoms.  Patient is to call with any side effects or complications as a result of the treatments today.      DOCUMENTATION OF TIME SPENT: Code selection for this visit was based on time spent : 30 min on date of service in preparing to see the patient, obtaining and/or reviewing separately obtained history, performing a medically appropriate examination, counseling and educating the patient/family/caregiver, ordering medications or testing, referring and communicating with other healthcare  providers, documenting clinical information in the E HR, independently interpreting results and communicating results to the patient/family/caregiver and care coordination with the patient's other providers.    Followup: Return in about 1 week (around 1/10/2024) for Wound followup.    Comment: Please note this report has been produced using speech recognition software and may contain errors related to that system including errors in grammar, punctuation, and spelling, as well as words and phrases that may be inappropriate. If there are any questions or concerns please feel free to contact the dictating provider for clarification.    Kait Vernon MD  1/3/2024  3:35 PM                   Weekly Wound Education Note    Teaching Provided To: Patient  Training Topics: Cleasing and general instructions;Compression;Discharge instructions;Dressing;Edema control  Training Method: Explain/Verbal  Training Response: Patient responds and understands;Reinforcement needed        Notes: Improving, silver foam, unna boot 30-40mmhg. Pt borught in supplies from [employer] but no compression stocking. Message sent to rep for an update on compression.        Weekly Wound Education Note     Teaching Provided To: Patient  Training Topics: Cleasing and general instructions;Compression;Discharge instructions;Dressing;Edema control  Training Method: Explain/Verbal  Training Response: Patient responds and understands;Reinforcement needed        Notes: Improving. Pt states ultrasound completed by vascular today. Endoform, hydrofera transfer, ABD pad, unna boot 30-40mmhg.  Ordered compression garment from [employer].      Wound Information/Order:  Wound Number: All wounds  Product:compression garment  Dispense: 30 days  Dressing Frequency:Change dressing daily and/or PRN     Was a Debridement performed: Yes, Debridement type: selective     Compression Stockings ordered: Yes   Product type: Other Medivan 2 layer 30-40mmhg  Frequency:  daily  Duration: 90 days        Calf  Point of Measurement - Right Calf: 38  Right Calf from:: Heel  Right Calf cm:: 35     Ankle  Point of Measurement - Right Ankle: 11  Right Ankle from:: Heel  Right Ankle cm:: 20.8     Notes: Only compression stocking, NO dressings. Dispense as written.     ** SENT LAST WEEK 12/27/23 ** Pt wants to return dressings and would like the compression garment we ordered**

## (undated) NOTE — LETTER
Date: 11/13/2024  Patient name: Pricilla Camejo  YOB: 1970  Medical Record Number: [id number]  Primary Coverage: Payor: BCBS OUT OF STATE / Plan: BCBS OUT OF STATE PPO / Product Type: *No Product type* /   Secondary Coverage:   Insurance ID: [id number]  Patient Address: [address] [place] [address]  Telephone Information:   Home Phone [phone]   Work Phone [phone]   Mobile [phone]         Encounter Date: 11/13/2024  Provider: Etelvina Blanton MD  Diagnosis:     ICD-10-CM   1. Non-pressure chronic ulcer of right ankle with fat layer exposed ([facility])  L97.312   2. Pseudomonas infection  A49.8   3. Infected wound  T14.8XXA    L08.9   4. Non-pressure chronic ulcer of left calf with fat layer exposed ([facility])  L97.222   5. Idiopathic chronic venous hypertension of right lower extremity with ulcer and inflammation ([facility])  I87.331   6. Pedal edema  R60.0   7. Lymphedema  I89.0   8. Sloughing of wound  R23.8   9. Delayed wound healing  T14.8XXD       Progress Note:  [facility] PROGRESS NOTE  ETELVINA BLANTON MD  11/13/2024    Chief Complaint:   Chief Complaint   Patient presents with    Wound Care     Patients is here for a follow up. Patients complain pain on the wound        HPI:   Subjective  Pricilla Camejo is a 54 year old male coming in for a follow-up visit.    HPI    Wounds stable / improving.   Wound culture positive for pseudomonas  - treated with a a week of bactrim and topical gentamicin which he only started few days ago.     Left leg wound much improved.     Right ankle wound also with increased epithelium moving in.     Edema persists.     New rash both legs from ? Compression being too tight.     Review of Systems  Negative except HPI   Denies chest pain / SOB / palpitations  Denies fever.     Allergies  Allergies[1]    Current Meds:  Current Outpatient Medications   Medication Sig Dispense Refill    gentamicin 0.1 % External Ointment Apply 1 Application  topically 3 (three) times daily. 30 g 3    sulfamethoxazole-trimethoprim -160 MG Oral Tab per tablet Take 1 tablet by mouth 2 (two) times daily for 7 days. 14 tablet 0    colchicine 0.6 MG Oral Tab Take 1 tablet (0.6 mg total) by mouth in the morning, at noon, and at bedtime. (Patient not taking: Reported on 12/4/2023) 15 tablet 0    PEG 3350-KCl-Na Bicarb-NaCl 420 g Oral Recon Soln Take as directed per MD 4000 mL 0    Meloxicam 15 MG Oral Tab Take 1 tablet (15 mg total) by mouth daily. (Patient not taking: Reported on 12/4/2023) 30 tablet 1    Diclofenac Sodium (VOLTAREN) 1 % Transdermal Gel Apply 2 g topically 4 (four) times daily. (Patient not taking: Reported on 12/4/2023) 1 Tube 3         EXAM:   Objective  Objective    Physical Exam    Vital Signs  Vitals:    11/13/24 1600   BP: 115/66   Pulse: 116   Resp: 16   Temp: 98.4 °F (36.9 °C)       Wound Assessment  Wound 11/06/24 #3 right medial ankle Ankle Right;Medial (Active)   Date First Assessed/Time First Assessed: 11/06/24 1517    Wound Number (Wound Clinic Only): #3 right medial ankle  Primary Wound Type: Venous Ulcer  Location: Ankle  Wound Location Orientation: Right;Medial      Assessments 11/6/2024  3:18 PM 11/13/2024  4:05 PM   Wound Image       Drainage Amount Scant Small   Drainage Description Serosanguineous Serosanguineous   Treatments Compression (coflex 2 30-40mmhg) --   Wound Length (cm) 4.8 cm 3.2 cm   Wound Width (cm) 2.4 cm 1.4 cm   Wound Surface Area (cm^2) 11.52 cm^2 4.48 cm^2   Wound Depth (cm) 0.1 cm 0.1 cm   Wound Volume (cm^3) 1.152 cm^3 0.448 cm^3   Wound Healing % -- 61   Margins Poorly defined Well-defined edges   Non-staged Wound Description Full thickness Full thickness   Xuan-wound Assessment Hemosiderin staining;Moist;Edema Hemosiderin staining;Moist;Edema   Wound Granulation Tissue Red;Firm --   Wound Bed Granulation (%) 50 % 40 %   Wound Bed Epithelium (%) -- 60 %   Wound Bed Slough (%) 50 % --   Wound Odor None None    Shape -- Bridged   Tunneling? No --   Undermining? No --   Sinus Tracts? No --       No associated orders.       Wound 11/06/24 #4 left anterior leg Leg Left;Anterior (Active)   Date First Assessed/Time First Assessed: 11/06/24 1518    Wound Number (Wound Clinic Only): #4 left anterior leg  Location: Leg  Wound Location Orientation: Left;Anterior      Assessments 11/6/2024  3:20 PM 11/13/2024  4:05 PM   Wound Image       Drainage Amount Unable to assess Unable to assess   Treatments Compression (coflex 2 30-40mmhg) --   Wound Length (cm) 0.1 cm 0.1 cm   Wound Width (cm) 0.2 cm 0.1 cm   Wound Surface Area (cm^2) 0.02 cm^2 0.01 cm^2   Wound Depth (cm) 0.1 cm 0.1 cm   Wound Volume (cm^3) 0.002 cm^3 0.001 cm^3   Wound Healing % -- 50   Margins Well-defined edges Well-defined edges   Non-staged Wound Description Full thickness Full thickness   Xuan-wound Assessment Hemosiderin staining;Edema Hemosiderin staining;Edema   Wound Granulation Tissue Red;Firm Pink;Firm   Wound Bed Granulation (%) 100 % 100 %   Wound Odor None None   Tunneling? No --   Undermining? No --   Sinus Tracts? No --       No associated orders.       Compression Wrap 11/06/24 Ankle Anterior;Right (Active)   Placement Date: 11/06/24   Location: Ankle  Wound Location Orientation: Anterior;Right      Assessments 11/6/2024  5:18 PM   Response to Treatment Well tolerated   Compression Layers Multilayer   Compression Product Type Coflex   Dressing Applied Yes   Compression Wrap Location Toes to Knee   Compression Wrap Status Clean;Dry;Intact       No associated orders.       Compression Wrap 11/06/24 Leg Anterior;Left (Active)   Placement Date: 11/06/24   Location: Leg  Wound Location Orientation: Anterior;Left      Assessments 11/6/2024  5:19 PM   Response to Treatment Well tolerated   Compression Layers Multilayer   Compression Product Type Coflex   Dressing Applied Yes   Compression Wrap Status Clean;Dry;Intact       No associated orders.           ASSESSMENT AND PLAN:     Assessment    Encounter Diagnosis  1. Non-pressure chronic ulcer of right ankle with fat layer exposed (HCC)    2. Pseudomonas infection    3. Infected wound    4. Non-pressure chronic ulcer of left calf with fat layer exposed (HCC)    5. Idiopathic chronic venous hypertension of right lower extremity with ulcer and inflammation (HCC)    6. Pedal edema    7. Lymphedema    8. Sloughing of wound    9. Delayed wound healing      PLAN OF CARE:    Continue gentamicin BID.   Complete course of bactrim.   Need to avoid standing all day at work  Recommend light duty - with option to sit / elevate legs - or take short term disability or FMLA until wound heals - pt not ready for this at this time.   Low salt diet.   Leg elevation  Watch out for signs of worsening infection - counseled.   Plan of care discussed with patient in detail - All questions answered   Return in one week.     Patient Instructions       Continue topical gentamicin BID   Recommend take time off work and elevate lower extremities. Modification at work for a sitting job until wound closes.   Avoid prolonged standing.   Low salt diet  Leg elevation  See me in one week.     Wound Cleaning and Dressings:    Dressing changes only in the clinic.   Shower with protection - get SHOWER BOOT or CAST COVER from any pharmacy ( [employer] or [employer] are options).   DRESSINGS: gentamicin / bordered gauze/   Change dressing BID.    Compression Therapy : compression garment.   Compression Therapy Instructions:  1. Okay to wear overnight       2.  Avoid prolonged standing in one place.  It is better to have your calf muscles moving       to pump fluid out of the legs.    3.  Elevate leg(s) above the level of the heart when sitting or as much as possible.    4.  Take your diuretics as directed by your provider.  Do not skip doses or change doses      unless instructed to do so by your provider.    5. Do not get leg(s) with compression wrap  wet. If wraps are too tight as indicated        By pain, numbness/tingling or discoloration of toes remove wrap completely       and call the   wound center.     Miscellaneous Instructions:  Supplement with a daily multivitamin   Low salt diet  Increase protein intake / consider protein supplements - see below  Elevate extremities at all times when sitting / laying down.    DIETARY MODIFICATIONS TO HELP WITH WOUND HEALING:    Protein: Meats, beans, eggs, milk and yogurt particularly Greek yogurt), tofu, soy nuts, soy protein products    Vitamin C: Citrus fruits and juices, strawberries, tomatoes, tomato juice, peppers, baked potatoes, spinach, broccoli, cauliflower, [place] sprouts, cabbage    Vitamin A: Dark green, leafy vegetables, orange or yellow vegetables, cantaloupe, fortified dairy products, liver, fortified cereals    Zinc: Fortified cereals, red meats, seafood    Consider Iván by [employer] (These are essential branch chain amino acids that help with tissue building and wound healing) and take 2 packets/day. you can order online at [employer] or [employer]    ADDITIONAL REMINDERS:    The treatment plan has been discussed at length with you and your provider. Follow all instructions carefully, it is very important. If you do not follow all instructions, you are at  risk of your wound not healing, infection, possible loss of limb and even end of life.  Please call the clinic during regular business hours ( 7:30 AM - 5:30 PM) if you notice increased bleeding, redness, warmth, pain or pus like drainage or start running a fever greater than 100.3.    For after hour emergencies, please call your primary physician or go to the nearest emergency room.      Patient/Caregiver Education: There are no barriers to learning. Medical education for above diagnosis given.   Answered all questions.    Outcome: Patient verbalizes understanding. Patient is notified to call with any questions, complications, allergies, or worsening  or changing symptoms.  Patient is to call with any side effects or complications as a result of the treatments today.      DOCUMENTATION OF TIME SPENT: Code selection for this visit was based on time spent : 35 min on date of service in preparing to see the patient, obtaining and/or reviewing separately obtained history, performing a medically appropriate examination, counseling and educating the patient/family/caregiver, ordering medications or testing, referring and communicating with other healthcare providers, documenting clinical information in the E HR, independently interpreting results and communicating results to the patient/family/caregiver and care coordination with the patient's other providers.    Followup: Return in about 1 week (around 11/20/2024) for Wound followup.      Note to Patient:  The 21st Century Cures Act makes medical notes like these available to patients in the interest of transparency. However, be advised this is a medical document and is intended as [id number]-[id number] communication; it is written in medical language and may appear blunt, direct, or contain abbreviations or verbiage that are unfamiliar. Medical documents are intended to carry relevant information, facts as evident, and the clinical opinion of the practitioner.    Also, please note that this report has been produced using speech recognition software and may contain errors related to that system including, but not limited to, errors in grammar, punctuation, and spelling, as well as words and phrases that possibly may have been recognized inappropriately.  If there are any questions or concerns, contact the dictating provider for clarification.      Kait Vernon MD  11/13/2024  4:24 PM                      [1] No Known Allergies      Weekly Wound Education Note    Teaching Provided To: Patient  Training Topics: Cleasing and general instructions;Compression;Discharge instructions;Dressing;Edema control  Training Method:  Explain/Verbal  Training Response: Patient responds and understands;Reinforcement needed        Notes: Improving but pain states the pain \"is still there\". He arrived with no compression stocking on, reminded to wear compression stockings, elevated legs as much as possible, limit salt in diet. Provider also recommends he make an appointment with vascular. Continue gentamicin, bordered gauze, spandagrip E or patient's own compression stocking. Ordered compression garment from [employer].    Wound Information/Order:  Wound Number: All wounds  Product: Only compression stocking needed, no dressings needed    Was a Debridement performed: Yes, Debridement type: mechanical    Compression Stockings ordered: Yes   Product type: Ju[employer] HD and a spandagrip E roll  Frequency: daily  Duration: 90 days      Calf  Point of Measurement - Left Calf: 36  Point of Measurement - Right Calf: 36  Left Calf from:: Heel  Calf Left cm:: 35.8  Right Calf from:: Heel  Right Calf cm:: 36    Ankle  Point of Measurement - Left Ankle: 11  Point of Measurement - Right Ankle: 11  Left Ankle from:: Heel  Left Ankle cm:: 21.5     Right Ankle from:: Heel  Right Ankle cm:: 22       Heel to Knee   48cm        Notes: BOTH LEGS. ONLY COMPRESSION IS NEEDED, NOT DRESSINGS. Please call the patient first if there is an OOP.

## (undated) NOTE — LETTER
Date: 12/4/2023    Patient Name: Apple Medrano      To Whom it may concern: This letter has been written to certify that the the above patient was seen at the [facility] for nonhealing ulcer secondary to chronic venous hypertension. This patient should be excused from attending regular work. Please allow him to do modified job, for which he does does not have to do prolonged standing. Kindly allow him to do a sitting job. Please contact me if you have any questions. The patient may return to regular work in 4 weeks if cleared by me.          Sincerely,        Jean Mcconnell MD, 12/04/23, 3:45 PM    [facility]  [id number]